# Patient Record
Sex: FEMALE | Race: WHITE | Employment: FULL TIME | URBAN - METROPOLITAN AREA
[De-identification: names, ages, dates, MRNs, and addresses within clinical notes are randomized per-mention and may not be internally consistent; named-entity substitution may affect disease eponyms.]

---

## 2018-09-04 DIAGNOSIS — G43.109 MIGRAINE WITH AURA AND WITHOUT STATUS MIGRAINOSUS, NOT INTRACTABLE: ICD-10-CM

## 2018-09-05 RX ORDER — SUMATRIPTAN 50 MG/1
50 TABLET, FILM COATED ORAL
Qty: 10 TABLET | Refills: 0 | Status: SHIPPED | OUTPATIENT
Start: 2018-09-05 | End: 2018-10-22 | Stop reason: SDUPTHER

## 2018-09-05 RX ORDER — LANCETS 30 GAUGE
EACH MISCELLANEOUS
Qty: 100 EACH | Refills: 0 | Status: SHIPPED | OUTPATIENT
Start: 2018-09-05 | End: 2018-10-22 | Stop reason: SDUPTHER

## 2018-10-22 DIAGNOSIS — G43.109 MIGRAINE WITH AURA AND WITHOUT STATUS MIGRAINOSUS, NOT INTRACTABLE: ICD-10-CM

## 2018-10-25 RX ORDER — LANCETS 30 GAUGE
EACH MISCELLANEOUS
Qty: 100 EACH | Refills: 0 | Status: SHIPPED | OUTPATIENT
Start: 2018-10-25 | End: 2018-12-07 | Stop reason: SDUPTHER

## 2018-10-25 RX ORDER — SUMATRIPTAN 50 MG/1
50 TABLET, FILM COATED ORAL
Qty: 10 TABLET | Refills: 0 | Status: SHIPPED | OUTPATIENT
Start: 2018-10-25 | End: 2019-11-25

## 2018-12-07 RX ORDER — LANCETS 30 GAUGE
EACH MISCELLANEOUS
Qty: 100 EACH | Refills: 0 | Status: SHIPPED | OUTPATIENT
Start: 2018-12-07 | End: 2019-01-16 | Stop reason: SDUPTHER

## 2018-12-22 ENCOUNTER — HOSPITAL ENCOUNTER (EMERGENCY)
Age: 33
Discharge: HOME | End: 2018-12-22
Attending: FAMILY MEDICINE
Payer: COMMERCIAL

## 2018-12-22 VITALS
TEMPERATURE: 98.5 F | RESPIRATION RATE: 16 BRPM | DIASTOLIC BLOOD PRESSURE: 84 MMHG | HEIGHT: 69 IN | BODY MASS INDEX: 20.44 KG/M2 | HEART RATE: 69 BPM | OXYGEN SATURATION: 99 % | SYSTOLIC BLOOD PRESSURE: 114 MMHG | WEIGHT: 138 LBS

## 2018-12-22 DIAGNOSIS — S09.90XA INJURY OF HEAD, INITIAL ENCOUNTER: Primary | ICD-10-CM

## 2018-12-22 PROCEDURE — 99283 EMERGENCY DEPT VISIT LOW MDM: CPT

## 2018-12-22 RX ORDER — METHYLPHENIDATE HYDROCHLORIDE 20 MG/1
20 TABLET ORAL 2 TIMES DAILY
COMMUNITY
End: 2019-11-25

## 2018-12-22 ASSESSMENT — PAIN DESCRIPTION - PAIN TYPE: TYPE: ACUTE PAIN;CHRONIC PAIN

## 2018-12-22 ASSESSMENT — PAIN DESCRIPTION - FREQUENCY: FREQUENCY: CONTINUOUS

## 2018-12-22 ASSESSMENT — PAIN SCALES - GENERAL: PAINLEVEL_OUTOF10: 3

## 2018-12-22 ASSESSMENT — PAIN DESCRIPTION - ORIENTATION: ORIENTATION: RIGHT;LEFT

## 2018-12-22 ASSESSMENT — PAIN DESCRIPTION - LOCATION: LOCATION: ABDOMEN;HEAD

## 2018-12-22 ASSESSMENT — PAIN DESCRIPTION - DESCRIPTORS: DESCRIPTORS: SHARP

## 2018-12-22 NOTE — ED PROVIDER NOTES
todays results, in addition to providing specific details for the plan of care and counseling regarding the diagnosis and prognosis. Questions are answered at this time and they are agreeable with the plan. Assessment      1. Injury of head, initial encounter      Plan   Discharge to home  Patient condition is good    New Medications     New Prescriptions    No medications on file     Electronically signed by Clau Dalton MD   DD: 12/22/18  **This report was transcribed using voice recognition software. Every effort was made to ensure accuracy; however, inadvertent computerized transcription errors may be present.   END OF ED PROVIDER NOTE          Clau Dalton MD  12/22/18 1112

## 2019-01-16 RX ORDER — LANCETS 30 GAUGE
EACH MISCELLANEOUS
Qty: 100 EACH | Refills: 0 | Status: SHIPPED
Start: 2019-01-16 | End: 2020-07-11

## 2019-04-27 ENCOUNTER — HOSPITAL ENCOUNTER (OUTPATIENT)
Age: 34
Discharge: HOME | End: 2019-04-29
Payer: COMMERCIAL

## 2019-04-27 ENCOUNTER — HOSPITAL ENCOUNTER (OUTPATIENT)
Dept: CT IMAGING | Age: 34
Discharge: HOME | End: 2019-04-29
Payer: COMMERCIAL

## 2019-04-27 DIAGNOSIS — R10.12 ABDOMINAL PAIN, LEFT UPPER QUADRANT: ICD-10-CM

## 2019-04-27 PROCEDURE — 74177 CT ABD & PELVIS W/CONTRAST: CPT

## 2019-04-27 PROCEDURE — 2580000003 HC RX 258: Performed by: INTERNAL MEDICINE

## 2019-04-27 PROCEDURE — 6360000004 HC RX CONTRAST MEDICATION: Performed by: RADIOLOGY

## 2019-04-27 RX ORDER — SODIUM CHLORIDE 0.9 % (FLUSH) 0.9 %
10 SYRINGE (ML) INJECTION PRN
Status: DISCONTINUED | OUTPATIENT
Start: 2019-04-27 | End: 2019-04-30 | Stop reason: HOSPADM

## 2019-04-27 RX ADMIN — Medication 10 ML: at 11:37

## 2019-04-27 RX ADMIN — IOPAMIDOL 100 ML: 755 INJECTION, SOLUTION INTRAVENOUS at 11:37

## 2019-11-25 ENCOUNTER — HOSPITAL ENCOUNTER (EMERGENCY)
Age: 34
Discharge: HOME | End: 2019-11-25
Attending: EMERGENCY MEDICINE
Payer: COMMERCIAL

## 2019-11-25 VITALS
WEIGHT: 135 LBS | DIASTOLIC BLOOD PRESSURE: 98 MMHG | HEART RATE: 90 BPM | RESPIRATION RATE: 16 BRPM | TEMPERATURE: 97.9 F | HEIGHT: 69 IN | BODY MASS INDEX: 19.99 KG/M2 | SYSTOLIC BLOOD PRESSURE: 142 MMHG | OXYGEN SATURATION: 99 %

## 2019-11-25 PROCEDURE — 99282 EMERGENCY DEPT VISIT SF MDM: CPT

## 2019-11-25 ASSESSMENT — PAIN DESCRIPTION - PAIN TYPE: TYPE: ACUTE PAIN

## 2019-11-25 ASSESSMENT — PAIN DESCRIPTION - LOCATION: LOCATION: HEAD

## 2019-11-25 ASSESSMENT — PAIN DESCRIPTION - DESCRIPTORS: DESCRIPTORS: DISCOMFORT;THROBBING

## 2019-11-25 ASSESSMENT — PAIN SCALES - GENERAL: PAINLEVEL_OUTOF10: 10

## 2020-07-10 ENCOUNTER — APPOINTMENT (OUTPATIENT)
Dept: CT IMAGING | Age: 35
DRG: 919 | End: 2020-07-10
Payer: COMMERCIAL

## 2020-07-10 ENCOUNTER — APPOINTMENT (OUTPATIENT)
Dept: GENERAL RADIOLOGY | Age: 35
DRG: 919 | End: 2020-07-10
Payer: COMMERCIAL

## 2020-07-10 ENCOUNTER — HOSPITAL ENCOUNTER (INPATIENT)
Age: 35
LOS: 1 days | Discharge: HOME | DRG: 919 | End: 2020-07-12
Attending: STUDENT IN AN ORGANIZED HEALTH CARE EDUCATION/TRAINING PROGRAM | Admitting: INTERNAL MEDICINE
Payer: COMMERCIAL

## 2020-07-10 LAB
ALBUMIN SERPL-MCNC: 5.2 G/DL (ref 3.5–5.2)
ALP BLD-CCNC: 86 U/L (ref 35–104)
ALT SERPL-CCNC: 15 U/L (ref 0–32)
ANION GAP SERPL CALCULATED.3IONS-SCNC: 28 MMOL/L (ref 7–16)
AST SERPL-CCNC: 17 U/L (ref 0–31)
BETA-HYDROXYBUTYRATE: >4.5 MMOL/L (ref 0.02–0.27)
BILIRUB SERPL-MCNC: 1.9 MG/DL (ref 0–1.2)
BILIRUBIN URINE: NEGATIVE
BLOOD, URINE: NEGATIVE
BUN BLDV-MCNC: 22 MG/DL (ref 6–20)
CALCIUM SERPL-MCNC: 10 MG/DL (ref 8.6–10.2)
CHLORIDE BLD-SCNC: 86 MMOL/L (ref 98–107)
CHP ED QC CHECK: YES
CLARITY: CLEAR
CO2: 17 MMOL/L (ref 22–29)
COLOR: YELLOW
CREAT SERPL-MCNC: 0.9 MG/DL (ref 0.5–1)
GFR AFRICAN AMERICAN: >60
GFR NON-AFRICAN AMERICAN: >60 ML/MIN/1.73
GLUCOSE BLD-MCNC: 475 MG/DL
GLUCOSE BLD-MCNC: 476 MG/DL (ref 74–99)
GLUCOSE URINE: 500 MG/DL
KETONES, URINE: >=80 MG/DL
LACTIC ACID, SEPSIS: 3.8 MMOL/L (ref 0.5–1.9)
LEUKOCYTE ESTERASE, URINE: NEGATIVE
LIPASE: 13 U/L (ref 13–60)
METER GLUCOSE: 413 MG/DL (ref 74–99)
METER GLUCOSE: 475 MG/DL (ref 74–99)
NITRITE, URINE: NEGATIVE
PH UA: 5.5 (ref 5–9)
PH VENOUS: 7.24 (ref 7.35–7.45)
POTASSIUM SERPL-SCNC: 4.8 MMOL/L (ref 3.5–5)
PROTEIN UA: NEGATIVE MG/DL
SODIUM BLD-SCNC: 131 MMOL/L (ref 132–146)
SPECIFIC GRAVITY UA: 1.02 (ref 1–1.03)
TOTAL PROTEIN: 8.2 G/DL (ref 6.4–8.3)
TROPONIN: <0.01 NG/ML (ref 0–0.03)
UROBILINOGEN, URINE: 0.2 E.U./DL

## 2020-07-10 PROCEDURE — 93005 ELECTROCARDIOGRAM TRACING: CPT | Performed by: STUDENT IN AN ORGANIZED HEALTH CARE EDUCATION/TRAINING PROGRAM

## 2020-07-10 PROCEDURE — 74177 CT ABD & PELVIS W/CONTRAST: CPT

## 2020-07-10 PROCEDURE — 83690 ASSAY OF LIPASE: CPT

## 2020-07-10 PROCEDURE — 84484 ASSAY OF TROPONIN QUANT: CPT

## 2020-07-10 PROCEDURE — 80053 COMPREHEN METABOLIC PANEL: CPT

## 2020-07-10 PROCEDURE — 82800 BLOOD PH: CPT

## 2020-07-10 PROCEDURE — 82010 KETONE BODYS QUAN: CPT

## 2020-07-10 PROCEDURE — 81003 URINALYSIS AUTO W/O SCOPE: CPT

## 2020-07-10 PROCEDURE — 82962 GLUCOSE BLOOD TEST: CPT

## 2020-07-10 PROCEDURE — 99285 EMERGENCY DEPT VISIT HI MDM: CPT

## 2020-07-10 PROCEDURE — 71045 X-RAY EXAM CHEST 1 VIEW: CPT

## 2020-07-10 PROCEDURE — 85025 COMPLETE CBC W/AUTO DIFF WBC: CPT

## 2020-07-10 PROCEDURE — 83605 ASSAY OF LACTIC ACID: CPT

## 2020-07-10 RX ORDER — METOCLOPRAMIDE HYDROCHLORIDE 5 MG/ML
10 INJECTION INTRAMUSCULAR; INTRAVENOUS ONCE
Status: DISCONTINUED | OUTPATIENT
Start: 2020-07-10 | End: 2020-07-12 | Stop reason: HOSPADM

## 2020-07-10 RX ORDER — 0.9 % SODIUM CHLORIDE 0.9 %
2000 INTRAVENOUS SOLUTION INTRAVENOUS ONCE
Status: DISCONTINUED | OUTPATIENT
Start: 2020-07-10 | End: 2020-07-12 | Stop reason: HOSPADM

## 2020-07-10 ASSESSMENT — PAIN SCALES - GENERAL: PAINLEVEL_OUTOF10: 6

## 2020-07-10 ASSESSMENT — PAIN DESCRIPTION - PAIN TYPE: TYPE: ACUTE PAIN

## 2020-07-11 PROBLEM — E10.10 DKA, TYPE 1, NOT AT GOAL (HCC): Status: ACTIVE | Noted: 2020-07-11

## 2020-07-11 LAB
ANION GAP SERPL CALCULATED.3IONS-SCNC: 12 MMOL/L (ref 7–16)
ANION GAP SERPL CALCULATED.3IONS-SCNC: 13 MMOL/L (ref 7–16)
ANION GAP SERPL CALCULATED.3IONS-SCNC: 15 MMOL/L (ref 7–16)
BASOPHILS ABSOLUTE: 0.11 E9/L (ref 0–0.2)
BASOPHILS RELATIVE PERCENT: 0.5 % (ref 0–2)
BUN BLDV-MCNC: 13 MG/DL (ref 6–20)
BUN BLDV-MCNC: 13 MG/DL (ref 6–20)
BUN BLDV-MCNC: 14 MG/DL (ref 6–20)
CALCIUM SERPL-MCNC: 8 MG/DL (ref 8.6–10.2)
CALCIUM SERPL-MCNC: 8 MG/DL (ref 8.6–10.2)
CALCIUM SERPL-MCNC: 8.1 MG/DL (ref 8.6–10.2)
CHLORIDE BLD-SCNC: 105 MMOL/L (ref 98–107)
CHLORIDE BLD-SCNC: 106 MMOL/L (ref 98–107)
CHLORIDE BLD-SCNC: 107 MMOL/L (ref 98–107)
CHP ED QC CHECK: YES
CHP ED QC CHECK: YES
CO2: 16 MMOL/L (ref 22–29)
CO2: 17 MMOL/L (ref 22–29)
CO2: 18 MMOL/L (ref 22–29)
CREAT SERPL-MCNC: 0.7 MG/DL (ref 0.5–1)
CREAT SERPL-MCNC: 0.8 MG/DL (ref 0.5–1)
CREAT SERPL-MCNC: 0.8 MG/DL (ref 0.5–1)
EKG ATRIAL RATE: 119 BPM
EKG P AXIS: 77 DEGREES
EKG P-R INTERVAL: 124 MS
EKG Q-T INTERVAL: 330 MS
EKG QRS DURATION: 78 MS
EKG QTC CALCULATION (BAZETT): 464 MS
EKG R AXIS: 80 DEGREES
EKG T AXIS: 57 DEGREES
EKG VENTRICULAR RATE: 119 BPM
EOSINOPHILS ABSOLUTE: 0.01 E9/L (ref 0.05–0.5)
EOSINOPHILS RELATIVE PERCENT: 0 % (ref 0–6)
GFR AFRICAN AMERICAN: >60
GFR NON-AFRICAN AMERICAN: >60 ML/MIN/1.73
GLUCOSE BLD-MCNC: 180 MG/DL (ref 74–99)
GLUCOSE BLD-MCNC: 227 MG/DL (ref 74–99)
GLUCOSE BLD-MCNC: 236 MG/DL (ref 74–99)
GLUCOSE BLD-MCNC: 296 MG/DL
GLUCOSE BLD-MCNC: 330 MG/DL
HBA1C MFR BLD: 9 % (ref 4–5.6)
HCT VFR BLD CALC: 44.7 % (ref 34–48)
HEMOGLOBIN: 15 G/DL (ref 11.5–15.5)
IMMATURE GRANULOCYTES #: 0.17 E9/L
IMMATURE GRANULOCYTES %: 0.8 % (ref 0–5)
LACTIC ACID, SEPSIS: 2.3 MMOL/L (ref 0.5–1.9)
LACTIC ACID: 1.3 MMOL/L (ref 0.5–2.2)
LYMPHOCYTES ABSOLUTE: 1.04 E9/L (ref 1.5–4)
LYMPHOCYTES RELATIVE PERCENT: 4.7 % (ref 20–42)
MAGNESIUM: 2 MG/DL (ref 1.6–2.6)
MAGNESIUM: 2 MG/DL (ref 1.6–2.6)
MAGNESIUM: 2.2 MG/DL (ref 1.6–2.6)
MCH RBC QN AUTO: 29 PG (ref 26–35)
MCHC RBC AUTO-ENTMCNC: 33.6 % (ref 32–34.5)
MCV RBC AUTO: 86.3 FL (ref 80–99.9)
METER GLUCOSE: 148 MG/DL (ref 74–99)
METER GLUCOSE: 161 MG/DL (ref 74–99)
METER GLUCOSE: 165 MG/DL (ref 74–99)
METER GLUCOSE: 165 MG/DL (ref 74–99)
METER GLUCOSE: 168 MG/DL (ref 74–99)
METER GLUCOSE: 171 MG/DL (ref 74–99)
METER GLUCOSE: 200 MG/DL (ref 74–99)
METER GLUCOSE: 206 MG/DL (ref 74–99)
METER GLUCOSE: 213 MG/DL (ref 74–99)
METER GLUCOSE: 231 MG/DL (ref 74–99)
METER GLUCOSE: 296 MG/DL (ref 74–99)
METER GLUCOSE: 330 MG/DL (ref 74–99)
MONOCYTES ABSOLUTE: 0.84 E9/L (ref 0.1–0.95)
MONOCYTES RELATIVE PERCENT: 3.8 % (ref 2–12)
NEUTROPHILS ABSOLUTE: 20.02 E9/L (ref 1.8–7.3)
NEUTROPHILS RELATIVE PERCENT: 90.2 % (ref 43–80)
PDW BLD-RTO: 12.5 FL (ref 11.5–15)
PHOSPHORUS: 1.8 MG/DL (ref 2.5–4.5)
PHOSPHORUS: 2.7 MG/DL (ref 2.5–4.5)
PHOSPHORUS: 3.3 MG/DL (ref 2.5–4.5)
PLATELET # BLD: 315 E9/L (ref 130–450)
PMV BLD AUTO: 9 FL (ref 7–12)
POTASSIUM SERPL-SCNC: 4.2 MMOL/L (ref 3.5–5)
RBC # BLD: 5.18 E12/L (ref 3.5–5.5)
RBC # BLD: NORMAL 10*6/UL
SODIUM BLD-SCNC: 136 MMOL/L (ref 132–146)
SODIUM BLD-SCNC: 136 MMOL/L (ref 132–146)
SODIUM BLD-SCNC: 137 MMOL/L (ref 132–146)
WBC # BLD: 22.2 E9/L (ref 4.5–11.5)

## 2020-07-11 PROCEDURE — 96375 TX/PRO/DX INJ NEW DRUG ADDON: CPT

## 2020-07-11 PROCEDURE — 1200000000 HC SEMI PRIVATE

## 2020-07-11 PROCEDURE — 6360000004 HC RX CONTRAST MEDICATION: Performed by: RADIOLOGY

## 2020-07-11 PROCEDURE — 93010 ELECTROCARDIOGRAM REPORT: CPT | Performed by: INTERNAL MEDICINE

## 2020-07-11 PROCEDURE — 6360000002 HC RX W HCPCS: Performed by: INTERNAL MEDICINE

## 2020-07-11 PROCEDURE — 36415 COLL VENOUS BLD VENIPUNCTURE: CPT

## 2020-07-11 PROCEDURE — 96374 THER/PROPH/DIAG INJ IV PUSH: CPT

## 2020-07-11 PROCEDURE — 83036 HEMOGLOBIN GLYCOSYLATED A1C: CPT

## 2020-07-11 PROCEDURE — 2500000003 HC RX 250 WO HCPCS: Performed by: INTERNAL MEDICINE

## 2020-07-11 PROCEDURE — 87081 CULTURE SCREEN ONLY: CPT

## 2020-07-11 PROCEDURE — 6370000000 HC RX 637 (ALT 250 FOR IP): Performed by: EMERGENCY MEDICINE

## 2020-07-11 PROCEDURE — 2580000003 HC RX 258: Performed by: EMERGENCY MEDICINE

## 2020-07-11 PROCEDURE — 6370000000 HC RX 637 (ALT 250 FOR IP): Performed by: INTERNAL MEDICINE

## 2020-07-11 PROCEDURE — 80048 BASIC METABOLIC PNL TOTAL CA: CPT

## 2020-07-11 PROCEDURE — 82962 GLUCOSE BLOOD TEST: CPT

## 2020-07-11 PROCEDURE — 84100 ASSAY OF PHOSPHORUS: CPT

## 2020-07-11 PROCEDURE — 83605 ASSAY OF LACTIC ACID: CPT

## 2020-07-11 PROCEDURE — 2580000003 HC RX 258: Performed by: RADIOLOGY

## 2020-07-11 PROCEDURE — 6360000002 HC RX W HCPCS

## 2020-07-11 PROCEDURE — 2580000003 HC RX 258: Performed by: INTERNAL MEDICINE

## 2020-07-11 PROCEDURE — 99223 1ST HOSP IP/OBS HIGH 75: CPT | Performed by: INTERNAL MEDICINE

## 2020-07-11 PROCEDURE — 6360000002 HC RX W HCPCS: Performed by: EMERGENCY MEDICINE

## 2020-07-11 PROCEDURE — 83735 ASSAY OF MAGNESIUM: CPT

## 2020-07-11 RX ORDER — ONDANSETRON 2 MG/ML
4 INJECTION INTRAMUSCULAR; INTRAVENOUS ONCE
Status: COMPLETED | OUTPATIENT
Start: 2020-07-11 | End: 2020-07-11

## 2020-07-11 RX ORDER — ONDANSETRON 2 MG/ML
INJECTION INTRAMUSCULAR; INTRAVENOUS
Status: COMPLETED
Start: 2020-07-11 | End: 2020-07-11

## 2020-07-11 RX ORDER — SODIUM CHLORIDE 0.9 % (FLUSH) 0.9 %
10 SYRINGE (ML) INJECTION 2 TIMES DAILY
Status: DISCONTINUED | OUTPATIENT
Start: 2020-07-11 | End: 2020-07-12 | Stop reason: HOSPADM

## 2020-07-11 RX ORDER — PROMETHAZINE HYDROCHLORIDE 25 MG/ML
12.5 INJECTION, SOLUTION INTRAMUSCULAR; INTRAVENOUS EVERY 8 HOURS PRN
Status: DISCONTINUED | OUTPATIENT
Start: 2020-07-11 | End: 2020-07-11

## 2020-07-11 RX ORDER — INSULIN GLARGINE 100 [IU]/ML
10 INJECTION, SOLUTION SUBCUTANEOUS ONCE
Status: COMPLETED | OUTPATIENT
Start: 2020-07-11 | End: 2020-07-11

## 2020-07-11 RX ORDER — DEXTROSE AND SODIUM CHLORIDE 5; .45 G/100ML; G/100ML
INJECTION, SOLUTION INTRAVENOUS CONTINUOUS PRN
Status: DISCONTINUED | OUTPATIENT
Start: 2020-07-11 | End: 2020-07-11

## 2020-07-11 RX ORDER — INSULIN GLARGINE 100 [IU]/ML
10 INJECTION, SOLUTION SUBCUTANEOUS NIGHTLY
Status: DISCONTINUED | OUTPATIENT
Start: 2020-07-12 | End: 2020-07-12 | Stop reason: HOSPADM

## 2020-07-11 RX ORDER — POTASSIUM CHLORIDE 7.45 MG/ML
10 INJECTION INTRAVENOUS PRN
Status: DISCONTINUED | OUTPATIENT
Start: 2020-07-11 | End: 2020-07-11

## 2020-07-11 RX ORDER — PROMETHAZINE HYDROCHLORIDE 25 MG/ML
12.5 INJECTION, SOLUTION INTRAMUSCULAR; INTRAVENOUS EVERY 8 HOURS PRN
Status: DISCONTINUED | OUTPATIENT
Start: 2020-07-11 | End: 2020-07-12 | Stop reason: HOSPADM

## 2020-07-11 RX ORDER — 0.9 % SODIUM CHLORIDE 0.9 %
15 INTRAVENOUS SOLUTION INTRAVENOUS ONCE
Status: DISCONTINUED | OUTPATIENT
Start: 2020-07-11 | End: 2020-07-12 | Stop reason: HOSPADM

## 2020-07-11 RX ORDER — MAGNESIUM SULFATE 1 G/100ML
1 INJECTION INTRAVENOUS PRN
Status: DISCONTINUED | OUTPATIENT
Start: 2020-07-11 | End: 2020-07-11

## 2020-07-11 RX ORDER — SODIUM CHLORIDE 9 MG/ML
INJECTION, SOLUTION INTRAVENOUS CONTINUOUS
Status: DISCONTINUED | OUTPATIENT
Start: 2020-07-11 | End: 2020-07-11

## 2020-07-11 RX ORDER — PAROXETINE HYDROCHLORIDE 20 MG/1
20 TABLET, FILM COATED ORAL NIGHTLY
Status: DISCONTINUED | OUTPATIENT
Start: 2020-07-11 | End: 2020-07-12 | Stop reason: HOSPADM

## 2020-07-11 RX ORDER — DEXTROSE MONOHYDRATE 50 MG/ML
100 INJECTION, SOLUTION INTRAVENOUS PRN
Status: DISCONTINUED | OUTPATIENT
Start: 2020-07-11 | End: 2020-07-12 | Stop reason: HOSPADM

## 2020-07-11 RX ORDER — DEXTROSE MONOHYDRATE 25 G/50ML
12.5 INJECTION, SOLUTION INTRAVENOUS PRN
Status: DISCONTINUED | OUTPATIENT
Start: 2020-07-11 | End: 2020-07-12 | Stop reason: HOSPADM

## 2020-07-11 RX ORDER — PROMETHAZINE HYDROCHLORIDE 25 MG/ML
12.5 INJECTION, SOLUTION INTRAMUSCULAR; INTRAVENOUS ONCE
Status: COMPLETED | OUTPATIENT
Start: 2020-07-11 | End: 2020-07-11

## 2020-07-11 RX ORDER — DEXTROSE MONOHYDRATE 25 G/50ML
12.5 INJECTION, SOLUTION INTRAVENOUS PRN
Status: DISCONTINUED | OUTPATIENT
Start: 2020-07-11 | End: 2020-07-11

## 2020-07-11 RX ORDER — SODIUM CHLORIDE 9 MG/ML
INJECTION, SOLUTION INTRAVENOUS CONTINUOUS
Status: DISCONTINUED | OUTPATIENT
Start: 2020-07-11 | End: 2020-07-11 | Stop reason: SDUPTHER

## 2020-07-11 RX ORDER — NICOTINE POLACRILEX 4 MG
15 LOZENGE BUCCAL PRN
Status: DISCONTINUED | OUTPATIENT
Start: 2020-07-11 | End: 2020-07-12 | Stop reason: HOSPADM

## 2020-07-11 RX ADMIN — INSULIN LISPRO 4 UNITS: 100 INJECTION, SOLUTION INTRAVENOUS; SUBCUTANEOUS at 17:19

## 2020-07-11 RX ADMIN — POTASSIUM CHLORIDE 10 MEQ: 7.46 INJECTION, SOLUTION INTRAVENOUS at 10:34

## 2020-07-11 RX ADMIN — INSULIN LISPRO 2 UNITS: 100 INJECTION, SOLUTION INTRAVENOUS; SUBCUTANEOUS at 21:36

## 2020-07-11 RX ADMIN — INSULIN GLARGINE 10 UNITS: 100 INJECTION, SOLUTION SUBCUTANEOUS at 13:26

## 2020-07-11 RX ADMIN — DEXTROSE AND SODIUM CHLORIDE: 5; 450 INJECTION, SOLUTION INTRAVENOUS at 13:05

## 2020-07-11 RX ADMIN — POTASSIUM CHLORIDE 10 MEQ: 7.46 INJECTION, SOLUTION INTRAVENOUS at 13:04

## 2020-07-11 RX ADMIN — IOPAMIDOL 100 ML: 755 INJECTION, SOLUTION INTRAVENOUS at 03:31

## 2020-07-11 RX ADMIN — Medication 10 ML: at 03:29

## 2020-07-11 RX ADMIN — ENOXAPARIN SODIUM 40 MG: 40 INJECTION SUBCUTANEOUS at 08:31

## 2020-07-11 RX ADMIN — ONDANSETRON 4 MG: 2 INJECTION INTRAMUSCULAR; INTRAVENOUS at 03:10

## 2020-07-11 RX ADMIN — PROMETHAZINE HYDROCHLORIDE 12.5 MG: 25 INJECTION INTRAMUSCULAR; INTRAVENOUS at 23:14

## 2020-07-11 RX ADMIN — PAROXETINE HYDROCHLORIDE 20 MG: 20 TABLET, FILM COATED ORAL at 21:46

## 2020-07-11 RX ADMIN — Medication 10 ML: at 22:00

## 2020-07-11 RX ADMIN — PROMETHAZINE HYDROCHLORIDE 12.5 MG: 25 INJECTION INTRAMUSCULAR; INTRAVENOUS at 05:26

## 2020-07-11 RX ADMIN — SODIUM PHOSPHATE, MONOBASIC, MONOHYDRATE 15 MMOL: 276; 142 INJECTION, SOLUTION INTRAVENOUS at 10:16

## 2020-07-11 RX ADMIN — SODIUM CHLORIDE 0.1 UNITS/KG/HR: 9 INJECTION, SOLUTION INTRAVENOUS at 04:12

## 2020-07-11 RX ADMIN — DEXTROSE AND SODIUM CHLORIDE 150 ML/HR: 5; 450 INJECTION, SOLUTION INTRAVENOUS at 06:46

## 2020-07-11 RX ADMIN — POTASSIUM CHLORIDE 10 MEQ: 7.46 INJECTION, SOLUTION INTRAVENOUS at 09:34

## 2020-07-11 RX ADMIN — PROMETHAZINE HYDROCHLORIDE 12.5 MG: 25 INJECTION INTRAMUSCULAR; INTRAVENOUS at 14:32

## 2020-07-11 ASSESSMENT — PAIN SCALES - GENERAL
PAINLEVEL_OUTOF10: 0

## 2020-07-11 NOTE — PROGRESS NOTES
I have personally seen and examined the patient  Patient was admitted for DKA    1. DKA1, started the patient on DKA protocol with IVF and insulin drip, anion gap on presentation was 28, now closed, we will repeat BMP and stop insulin drip  2. DM I, usually on insulin pump, she stopped it a couple of days ago which would have been the trigger for DKA.  We will start the patient on 10 units of lantus and sliding scale

## 2020-07-11 NOTE — CONSULTS
Critical Care Admit/Consult Note         Patient - Baron Prakash   MRN -  [id number]   [place] # - [de-identified]   - 1985      Date of Admission -  7/10/2020  8:57 PM  Date of evaluation -  2020/0213-A   Hospital Day - 0            ADMIT/CONSULT DETAILS     Reason for Admit/Consult   DKA     Consulting Service/Physician   Consulting - Sonia Tamayo MD  Primary Care Physician - MD LUZ Adam   The patient is a 28 y.o. female with significant past medical history of insulin-dependent diabetes (on insulin pump, onset after development of gestational diabetes in ) who presented to [facility] emergency department yesterday evening with nausea, vomiting, abdominal pain. The patient had been in a aquatic setting and believes that her pump was not appropriately sticking to her skin. On evaluation in the emergency department, she was found to be in DKA with an elevated anion gap as well as an elevated beta hydroxybutyrate and a blood glucose of 476. For this reason an insulin drip was initiated and the patient was admitted to the ICU for close observation and monitoring.       Past Medical History         Diagnosis Date    ADD (attention deficit disorder with hyperactivity)     Anxiety     Depression     Diabetes mellitus ([facility] [address]     Headache         Past Surgical History           Procedure Laterality Date    CHOLECYSTECTOMY      ENDOMETRIAL ABLATION      HYSTERECTOMY      TUBAL LIGATION           Current Medications   Current Medications    sodium chloride flush  10 mL Intravenous BID    enoxaparin  40 mg Subcutaneous Daily    sodium chloride  2,000 mL Intravenous Once    metoclopramide  10 mg Intravenous Once     glucose, dextrose, glucagon (rDNA), dextrose, dextrose, potassium chloride, magnesium sulfate, sodium phosphate IVPB **OR** sodium phosphate IVPB **OR** sodium phosphate IVPB, dextrose 5 % and 0.45 % NaCl  IV Drips/Infusions   dextrose      insulin 4.38 Units/hr (07/11/20 0815)    sodium chloride      dextrose 5 % and 0.45 % NaCl 150 mL/hr (07/11/20 0646)     Home Medications  Medications Prior to Admission: PARoxetine (PAXIL) 30 MG tablet, Take 20 mg by mouth nightly     Diet/Nutrition   Diet NPO Effective Now    Allergies   Prozac [fluoxetine hcl] and Zoloft [sertraline hcl]    Social History   Tobacco   reports that she has never smoked. She has never used smokeless tobacco.    Alcohol     reports no history of alcohol use.     Occupational history :    Family History         Problem Relation Age of Onset    Diabetes Mother     Elevated Lipids Mother     Cancer Father         testicular cancer possibly    Seizures Sister          ROS     REVIEW OF SYSTEMS:  CONSTITUTIONAL:  negative for  fevers, chills and sweats, positive for fatigue and malaise  EYES:  negative for  blurred vision and visual disturbance  HEENT:  negative for  sore throat and hoarseness  RESPIRATORY:  negative for  cough with sputum, dyspnea and wheezing  CARDIOVASCULAR:  negative for  chest pain, dyspnea, palpitations  GASTROINTESTINAL:  positive for nausea, vomiting and abdominal pain  GENITOURINARY:  negative for dysuria and urinary incontinence  ENDOCRINE:  negative except for diabetic symptoms including neither nausea nor vomiting  MUSCULOSKELETAL:  negative for  myalgias and arthralgias  NEUROLOGICAL:  negative for headaches, dizziness, seizures, weakness, numbness and syncope    Lines and Devices    PIV x 2    Mechanical Ventilation Data   VENT SETTINGS (Comprehensive)  Vent Information  SpO2: 100 %  Additional Respiratory  Assessments  Pulse: 105  Resp: 22  SpO2: 100 %    ABG  Lab Results   Component Value Date    PH 7.256 06/15/2015    PH 7.36 12/04/2011    PCO2 23.2 06/15/2015    PO2 103.5 06/15/2015    HCO3 10.1 06/15/2015    O2SAT 98.0 06/15/2015     Lab Results   Component Value Date    MODE RA 06/15/2015           Vitals    height is 5' 9\" (1.753 m) and weight is 131 lb 9.8 oz (59.7 kg). Her oral temperature is 99.2 °F (37.3 °C). Her blood pressure is 108/70 and her pulse is 105. Her respiration is 22 and oxygen saturation is 100%. Temperature Range: Temp: 99.2 °F (37.3 °C) Temp  Av.4 °F (36.9 °C)  Min: 97.5 °F (36.4 °C)  Max: 99.2 °F (37.3 °C)  BP Range:  Systolic (10LWY), XWO:306 , Min:108 , EUF:281     Diastolic (40SCV), DPE:77, Min:65, Max:81    Pulse Range: Pulse  Av.2  Min: 104  Max: 132  Respiration Range: Resp  Av.5  Min: 16  Max: 22  Current Pulse Ox[de-identified]  SpO2: 100 %  24HR Pulse Ox Range:  SpO2  Av.2 %  Min: 98 %  Max: 100 %  Oxygen Amount and Delivery:        I/O (24 Hours)    Patient Vitals for the past 8 hrs:   BP Temp Temp src Pulse Resp SpO2 Weight   20 0634 108/70 99.2 °F (37.3 °C) Oral 105 22 100 % 131 lb 9.8 oz (59.7 kg)   20 0554 110/76 -- -- 113 -- 100 % --   20 0549 110/76 -- -- 117 16 98 % --   20 0509 110/65 98.6 °F (37 °C) Oral 104 18 98 % --     No intake or output data in the 24 hours ending 20 0836  No intake/output data recorded. Patient Vitals for the past 96 hrs (Last 3 readings):   Weight   20 0634 131 lb 9.8 oz (59.7 kg)   07/10/20 2054 135 lb (61.2 kg)         Drains/Tubes Outputs  None  Exam       PHYSICAL EXAM:  CONSTITUTIONAL:  awake, alert, cooperative, no apparent distress, and appears stated age  EYES:  Lids and lashes normal, pupils equal, round and reactive to light, extra ocular muscles intact, sclera clear, conjunctiva normal  ENT:  Normocephalic, without obvious abnormality, atraumatic, sinuses nontender on palpation, external ears without lesions, oral pharynx with moist mucus membranes, tonsils without erythema or exudates, gums normal and good dentition.   NECK:  Supple, symmetrical, trachea midline, no adenopathy, thyroid symmetric, not enlarged and no tenderness, skin normal  HEMATOLOGIC/LYMPHATICS:  no cervical lymphadenopathy  LUNGS:  No increased work of last 72 hours. Lactic Acid  Lab Results   Component Value Date    LACTA 1.2 01/16/2017    LACTA 1.4 06/15/2015    LACTA 3.3 06/15/2015        BNP   No results for input(s): BNP in the last 72 hours. Cultures     No results for input(s): BC in the last 72 hours. No results for input(s): Darlis Jackman in the last 72 hours. No results for input(s): LABURIN in the last 72 hours. Radiology   Ct Abdomen Pelvis W Iv Contrast Additional Contrast? None    Result Date: 7/11/2020  PROCEDURE INFORMATION: Exam: CT Abdomen And Pelvis With Contrast Exam date and time: 7/10/2020 10:45 PM Age: 28years old Clinical indication: Abdominal pain; Prior surgery; Surgery type: [place], partial hyster; Additional info: Periumbilical pain TECHNIQUE: Imaging protocol: Computed tomography of the abdomen and pelvis with intravenous contrast. Radiation optimization: All CT scans at this facility use at least one of these dose optimization techniques: automated exposure control; mA and/or kV adjustment per patient size (includes targeted exams where dose is matched to clinical indication); or iterative reconstruction. Contrast material: [id number]; Contrast volume: 100 ml; Contrast route: INTRAVENOUS (IV);  COMPARISON: CT ABDOMEN PELVIS W CONTRAST 4/27/2019 11:27 AM FINDINGS: Liver: Hypoattenuation seen adjacent to the falciform fissure compatible with some focal fatty infiltration. Gallbladder and bile ducts: Status post cholecystectomy. Pancreas: Normal. No ductal dilation. Spleen: Normal. No splenomegaly. Adrenals: Normal. No mass. Kidneys and ureters: Normal. No hydronephrosis. Stomach and bowel: Unremarkable. No obstruction. No mucosal thickening. Appendix: The appendix is visualized and is normal in configuration. Intraperitoneal space: Unremarkable. No free air. No significant fluid collection. Vasculature: Unremarkable. No abdominal aortic aneurysm. Lymph nodes: Unremarkable. No enlarged lymph nodes.  Bladder: Unremarkable as visualized. Reproductive: There are 2 contiguous cystic masses seen associated with the right ovary. A 1.3 x 1.6 x 1.5 cm simple appearing cyst is seen. And adjacent 1.5 x 2.0 x 1.8 cm cyst is seen exhibiting some peripheral enhancement likely representing a small hemorrhagic cyst. The uterus is retroflexed. Bones/joints: Unremarkable. No acute fracture. Soft tissues: Unremarkable. 1. There are 2 benign-appearing cysts within the right ovary. The largest measures 2 cm in its greatest diameter and exhibits peripheral enhancement compatible with a hemorrhagic cyst. No further workup is needed. 2. Normal appendix 3. Focal fatty infiltration adjacent to the falciform fissure of the liver This report has been electronically signed by Raheel Tom MD.    Xr Chest Portable    Result Date: 7/10/2020  Single frontal projection (one view) of the chest. COMPARISON: May 30, 2017. FINDINGS: The heart, lungs, mediastinum and regional skeleton are unremarkable. The costophrenic angles are sharp and clear. There is no evidence of mediastinal shift. The heart is not enlarged. There is no evidence of hilar adenopathy. Lungs are negative for evidence of acute airspace consolidation, effusion, atelectasis, pneumothorax, pathologic nodularity or interstitial thickening. Regional bone density and trabecular pattern are normal.     Negative one view chest.    SYSTEMS ASSESSMENT    Neuro   A&Ox4, no acute pathology  Avoid Sedating Agents    Respiratory   wnl   Cxr reviewed normal   Wean oxygen as tolerated.  Keep O2 sat 90-92%    Cardiovascular   icu telemetry monitoring   No cp   ekg normal on admission     Gastrointestinal   GI symptoms 2/2 DKA, now resolved  NPO until DKA resolved    Renal   Monitor urine output  Creatinine wnl   Avoid nephrotoxic agents  Monitor potassium shift as BG is corrected  Replete electrolytes as needed     Infectious Disease   Initial leukocytosis of 22, likely reactive and worsened with volume contraction  Anticipate improvement with DKA treatment  Afebrile, no overt infectious source  AM CBC  Continue to monitor    Hematology/Oncology     H/h increased likely hemoconcentration. Monitor trend   Platelets wnl   dvt prophylaxis     Endocrine   DKA per protocol inciting factor pump malfunction  Insulin Drip 0.1U/Kg/Hr  /hr  Switch to D5 1/2NS once BG below 250  POC glucose QH  BMP Q4H  Transition to SQ insulin once AG closed x2          Social/Spiritual/DNR/Other   Full code  Lovenox/SCD's for DVT prophylaxis    Mingo Lloyd D. VIVIAN  PGY-2  8:54 AM EDT      I personally saw, examined and provided care for the patient. Radiographs, labs and medication list were reviewed by me independently. I spoke with bedside nursing, therapists and consultants. Critical care services and times documented are independent of procedures and multidisciplinary rounds with Residents. Additionally comprehensive, multidisciplinary rounds were conducted with the MICU team. The case was discussed in detail and plans for care were established. Review of Residents documentation was conducted and revisions were made as appropriate. I agree with the above documented exam, problem list and plan of care. dka per protocol   Trend labs     Donavan Fernandes M.D.    Pulmonary/Critical Care Medicine   35 min cct excluding procedures

## 2020-07-11 NOTE — ED PROVIDER NOTES
HPI:  7/10/20, Time: 10:57 PM EDT         Yaa Leigh is a 28 y.o. female presenting to the ED for nausea/vomiting, palpitations, and upper abdominal pain, beginning today. The complaint has been persistent, severe in severity, and worsened by nothing. Patient has histories of diabetes. States that she feels like she is in DKA. She had sudden onset of nausea/vomiting 2 hours prior to coming emergency department. States that she is having a headache now as well. Denies any fevers or chest pain, shortness of breath. Patient has history of hysterectomy and states there is no chance of pregnancy. Blood sugars greater than 400 on presentation emergency department. States that she does not know why she would be in DKA. States that she is very thirsty. No other complaints at this time. Patient denies fever/chills, cough, congestion, chest pain, shortness of breath, edema, headache, visual disturbances, focal paresthesias, focal weakness, constipation, dysuria, hematuria, trauma, neck or back pain or other complaints. ROS:   Pertinent positives and negatives are stated within HPI, all other systems reviewed and are negative.      --------------------------------------------- PAST HISTORY ---------------------------------------------  Past Medical History:  has a past medical history of ADD (attention deficit disorder with hyperactivity), Anxiety, Depression, Diabetes mellitus ([facility] [address] and Headache. Past Surgical History:  has a past surgical history that includes Hysterectomy; Tubal ligation; Endometrial ablation; and Cholecystectomy. Social History:  reports that she has never smoked. She has never used smokeless tobacco. She reports that she does not drink alcohol or use drugs. Family History: family history includes Cancer in her father; Diabetes in her mother; Elevated Lipids in her mother; Seizures in her sister. The patients home medications have been reviewed.     Allergies: Prozac [fluoxetine hcl] and Zoloft [sertraline hcl]        ---------------------------------------------------PHYSICAL EXAM--------------------------------------    Constitutional:  Well developed, well nourished, no acute distress, non-toxic appearance   Eyes:  PERRL, conjunctiva normal, EOMI  HENT:  Atraumatic, external ears normal, nose normal, oropharynx moist. Neck- normal range of motion, no tenderness, supple   Respiratory:  No respiratory distress, normal breath sounds, no rales, no wheezing   Cardiovascular:  Normal rate, normal rhythm, no murmurs, no gallops, no rubs. Radial and DP pulses 2+ bilaterally. GI: Umbilical tenderness on palpation. :  No costovertebral angle tenderness   Musculoskeletal:  No edema, no tenderness, no deformities. Back- no tenderness  Integument:  Well hydrated, no rash. Adequate perfusion. Lymphatic:  No lymphadenopathy noted   Neurologic:  Alert & oriented x 3, CN 2-12 normal, normal motor function, normal sensory function, no focal deficits noted. Psychiatric:  Speech and behavior appropriate       -------------------------------------------------- RESULTS -------------------------------------------------  I have personally reviewed all laboratory and imaging results for this patient. Results are listed below.      LABS:  Results for orders placed or performed during the hospital encounter of 07/10/20   Beta-Hydroxybutyrate   Result Value Ref Range    Beta-Hydroxybutyrate >4.50 (H) 0.02 - 0.27 mmol/L   Comprehensive metabolic panel   Result Value Ref Range    Sodium 131 (L) 132 - 146 mmol/L    Potassium 4.8 3.5 - 5.0 mmol/L    Chloride 86 (L) 98 - 107 mmol/L    CO2 17 (L) 22 - 29 mmol/L    Anion Gap 28 (H) 7 - 16 mmol/L    Glucose 476 (H) 74 - 99 mg/dL    BUN 22 (H) 6 - 20 mg/dL    CREATININE 0.9 0.5 - 1.0 mg/dL    GFR Non-African American >60 >=60 mL/min/1.73    GFR African American >60     Calcium 10.0 8.6 - 10.2 mg/dL    Total Protein 8.2 6.4 - 8.3 g/dL    Alb 5.2 3.5 - 5.2 g/dL    Total Bilirubin 1.9 (H) 0.0 - 1.2 mg/dL    Alkaline Phosphatase 86 35 - 104 U/L    ALT 15 0 - 32 U/L    AST 17 0 - 31 U/L   CBC auto differential   Result Value Ref Range    WBC 22.2 (H) 4.5 - 11.5 E9/L    RBC 5.18 3.50 - 5.50 E12/L    Hemoglobin 15.0 11.5 - 15.5 g/dL    Hematocrit 44.7 34.0 - 48.0 %    MCV 86.3 80.0 - 99.9 fL    MCH 29.0 26.0 - 35.0 pg    MCHC 33.6 32.0 - 34.5 %    RDW 12.5 11.5 - 15.0 fL    Platelets 404 143 - 402 E9/L    MPV 9.0 7.0 - 12.0 fL    Neutrophils % 90.2 (H) 43.0 - 80.0 %    Immature Granulocytes % 0.8 0.0 - 5.0 %    Lymphocytes % 4.7 (L) 20.0 - 42.0 %    Monocytes % 3.8 2.0 - 12.0 %    Eosinophils % 0.0 0.0 - 6.0 %    Basophils % 0.5 0.0 - 2.0 %    Neutrophils Absolute 20.02 (H) 1.80 - 7.30 E9/L    Immature Granulocytes # 0.17 E9/L    Lymphocytes Absolute 1.04 (L) 1.50 - 4.00 E9/L    Monocytes Absolute 0.84 0.10 - 0.95 E9/L    Eosinophils Absolute 0.01 (L) 0.05 - 0.50 E9/L    Basophils Absolute 0.11 0.00 - 0.20 E9/L    RBC Morphology Normal    Troponin   Result Value Ref Range    Troponin <0.01 0.00 - 0.03 ng/mL   Lactate, Sepsis   Result Value Ref Range    Lactic Acid, Sepsis 3.8 (H) 0.5 - 1.9 mmol/L   Lactate, Sepsis   Result Value Ref Range    Lactic Acid, Sepsis 2.3 (H) 0.5 - 1.9 mmol/L   PH, VENOUS   Result Value Ref Range    pH, Ivan 7.24 (L) 7.35 - 7.45   URINALYSIS   Result Value Ref Range    Color, UA Yellow Straw/Yellow    Clarity, UA Clear Clear    Glucose, Ur 500 (A) Negative mg/dL    Bilirubin Urine Negative Negative    Ketones, Urine >=80 (A) Negative mg/dL    Specific Gravity, UA 1.025 1.005 - 1.030    Blood, Urine Negative Negative    pH, UA 5.5 5.0 - 9.0    Protein, UA Negative Negative mg/dL    Urobilinogen, Urine 0.2 <2.0 E.U./dL    Nitrite, Urine Negative Negative    Leukocyte Esterase, Urine Negative Negative   Lipase   Result Value Ref Range    Lipase 13 13 - 60 U/L   POCT glucose   Result Value Ref Range    Glucose 475 mg/dL    QC OK?  yes    POCT Glucose   Result Value Ref Range    Meter Glucose 475 (H) 74 - 99 mg/dL   POCT Glucose   Result Value Ref Range    Meter Glucose 413 (H) 74 - 99 mg/dL   POCT Glucose   Result Value Ref Range    Glucose 330 mg/dL    QC OK? yes    POCT Glucose   Result Value Ref Range    Meter Glucose 330 (H) 74 - 99 mg/dL   EKG 12 Lead   Result Value Ref Range    Ventricular Rate 119 BPM    Atrial Rate 119 BPM    P-R Interval 124 ms    QRS Duration 78 ms    Q-T Interval 330 ms    QTc Calculation (Bazett) 464 ms    P Axis 77 degrees    R Axis 80 degrees    T Axis 57 degrees       RADIOLOGY:  Interpreted by Radiologist.  CT ABDOMEN PELVIS W IV CONTRAST Additional Contrast? None   Final Result   1. There are 2 benign-appearing cysts within the right ovary. The largest    measures 2 cm in its greatest diameter and exhibits peripheral enhancement    compatible with a hemorrhagic cyst. No further workup is needed. 2. Normal appendix    3. Focal fatty infiltration adjacent to the falciform fissure of the liver       This report has been electronically signed by Paulette Torres MD.      XR CHEST PORTABLE   Final Result      Negative one view chest.            EKG Interpretation  Interpreted by emergency department physician, Dr. Joseline Ayala: normal  Rhythm: normal sinus   Clinical Impression: non-specific EKG  Comparison to prior EKG: None      ------------------------- NURSING NOTES AND VITALS REVIEWED ---------------------------   The nursing notes within the ED encounter and vital signs as below have been reviewed by myself. /81   Pulse 132   Temp 97.5 °F (36.4 °C) (Infrared)   Resp 18   Ht 5' 9\" (1.753 m)   Wt 135 lb (61.2 kg)   SpO2 100%   BMI 19.94 kg/m²   Oxygen Saturation Interpretation: Normal    The patients available past medical records and past encounters were reviewed.         ------------------------------ ED COURSE/MEDICAL DECISION MAKING----------------------  Medications   0.9 % sodium chloride bolus (has no administration in time range)   metoclopramide (REGLAN) injection 10 mg (has no administration in time range)   ondansetron (ZOFRAN) 4 MG/2ML injection (has no administration in time range)   glucose (GLUTOSE) 40 % oral gel 15 g (has no administration in time range)   dextrose 50 % IV solution (has no administration in time range)   glucagon (rDNA) injection 1 mg (has no administration in time range)   dextrose 5 % solution (has no administration in time range)   ondansetron (ZOFRAN) injection 4 mg (has no administration in time range)   insulin regular (HUMULIN R;NOVOLIN R) 100 Units in sodium chloride 0.9 % 100 mL infusion (0.1 Units/kg/hr × 61.2 kg Intravenous New Bag 7/11/20 4900)   sodium chloride flush 0.9 % injection 10 mL (10 mLs Intravenous Given 7/11/20 4669)   promethazine (PHENERGAN) injection 12.5 mg (has no administration in time range)   iopamidol (ISOVUE-370) 76 % injection 100 mL (100 mLs Intravenous Given 7/11/20 8681)           Procedures:      Medical Decision Making:    Pt hyperglycemia type 1 dm, dka. Ph 7.4, waited for k+ before starting insulin, k+ 4.8, gtt started. Wbc noted, ua neg, cxr neg, and ct neg for emergent cause. Will admit to icu. hospitalist to admit, intensivist consulted      This patient's ED course included: a personal history and physicial eaxmination    This patient has remained hemodynamically stable during their ED course. Re-Evaluations:             Time: 300  Re-evaluation. Patients symptoms show no change  Repeat physical examination is improved        Consultations:             hospitalist  Critical care    Critical Care: 35 min        Counseling: The emergency provider has spoken with the patient and discussed todays results, in addition to providing specific details for the plan of care and counseling regarding the diagnosis and prognosis.   Questions are answered at this time and they are agreeable with the plan.       --------------------------------- IMPRESSION AND DISPOSITION ---------------------------------    IMPRESSION  1.  Diabetic ketoacidosis without coma associated with type 1 diabetes mellitus ([facility]        DISPOSITION  Disposition: Admit to CCU/ICU  Patient condition is serious                 Kamaljit Mireles MD  07/11/20 8284

## 2020-07-11 NOTE — H&P
[facility] History and Physical      CHIEF COMPLAINT:  Nausea and Vomiting    History of Present Illness: This is a pleasant 35-year-old female who presents to [facility] complaints of nausea and vomiting. All history has been obtained from the patient and review of medical records. She has a known history of type 1 diabetes. She follows Dr. Tucker York at the [facility] clinic of endocrinology. On January 10 she was switched over to an Omnipod infusion pump. Her pump settings were basal rate of 1.05 with a carb ratio of 1-10. She was placed on a Humalog infusion. She states that she was getting strange readings from her pump for the past few days. She apparently disconnected her pump. Unfortunately she did not take any long-acting insulin or any subcu after disconnecting her pump. Subsequently she began to feel nauseated and threw up. She presented the ED for this. There she was found a blood glucose of 476 with a beta hydroxybutyric acid greater than 4.50. Diagnosis was made of diabetic ketoacidosis. Patient also had some elevated lactic acid at 3.8. Because that she was admitted for treatment of her diabetic ketoacidosis. Informant(s) for H&P:    REVIEW OF SYSTEMS:  A comprehensive review of systems was negative except for: what is in the HPI      PMH:  Past Medical History:   Diagnosis Date    ADD (attention deficit disorder with hyperactivity)     Anxiety     Depression     Diabetes mellitus ([facility] [address]     Headache        Surgical History:  Past Surgical History:   Procedure Laterality Date    CHOLECYSTECTOMY      ENDOMETRIAL ABLATION      HYSTERECTOMY      TUBAL LIGATION         Medications Prior to Admission:    Prior to Admission medications    Medication Sig Start Date End Date Taking?  Authorizing Provider   PARoxetine (PAXIL) 30 MG tablet Take 20 mg by mouth nightly     Historical Provider, MD       Allergies:    Prozac [fluoxetine hcl] and Zoloft [sertraline hcl]    Social History:    reports that she has never smoked. She has never used smokeless tobacco. She reports that she does not drink alcohol or use drugs. Family History:   family history includes Cancer in her father; Diabetes in her mother; Elevated Lipids in her mother; Seizures in her sister. PHYSICAL EXAM:  Vitals:  /65   Pulse 104   Temp 98.6 °F (37 °C) (Oral)   Resp 18   Ht 5' 9\" (1.753 m)   Wt 135 lb (61.2 kg)   SpO2 98%   BMI 19.94 kg/m²     General Appearance: alert and oriented to person, place and time and in no acute distress  Skin: warm and dry  Head: normocephalic and atraumatic  Eyes: pupils equal, round, and reactive to light, extraocular eye movements intact, conjunctivae normal  Neck: neck supple and non tender without mass   Pulmonary/Chest: clear to auscultation bilaterally- no wheezes, rales or rhonchi, normal air movement, no respiratory distress  Cardiovascular: normal rate, normal S1 and S2 and no carotid bruits  Abdomen: soft, non-tender, non-distended, normal bowel sounds, no masses or organomegaly  Extremities: no cyanosis, no clubbing and no edema  Neurologic: no cranial nerve deficit and speech normal        LABS:  Recent Labs     07/10/20  2252 07/11/20  0317 07/11/20  0506   *  --   --    K 4.8  --   --    CL 86*  --   --    CO2 17*  --   --    BUN 22*  --   --    CREATININE 0.9  --   --    GLUCOSE 476* 330 296   CALCIUM 10.0  --   --        Recent Labs     07/10/20  2252   WBC 22.2*   RBC 5.18   HGB 15.0   HCT 44.7   MCV 86.3   MCH 29.0   MCHC 33.6   RDW 12.5      MPV 9.0       No results for input(s): POCGLU in the last 72 hours. Radiology:   CT ABDOMEN PELVIS W IV CONTRAST Additional Contrast? None   Final Result   1. There are 2 benign-appearing cysts within the right ovary.  The largest    measures 2 cm in its greatest diameter and exhibits peripheral enhancement    compatible with a hemorrhagic cyst. No further workup is needed. 2. Normal appendix    3. Focal fatty infiltration adjacent to the falciform fissure of the liver       This report has been electronically signed by Malgorzata Gold MD.      XR CHEST PORTABLE   Final Result      Negative one view chest.            Active Problems:    DKA, type 1, not at goal [facility])  Resolved Problems:    * No resolved hospital problems. *      ASSESSMENT/PLAN:    Diabetic ketoacidosis  -Secondary from discontinue insulin pump infusion  -We will continue DKA protocol  -Was started in January by Dr. Kika Samuel at the [facility] clinic  -Patient discontinued due to questionable malfunctioning  -Blood glucose was greater than 400 with elevated beta hydroxybutyric acid  -Placed on insulin infusion going to the ICU  -Initial settings were basal rate of 1.05 for 24 hours and a carb ratio 1:10 (Humalog)  - will bring in the insulin pump in the morning  -We will need to reinitiate at a previous rate  -Possible consult endocrinology for assistance    Type 1 insulin-dependent diabetes uncontrolled  -Last A1c in 2019 was 9.1  -We will repeat A1c to see how control was  -Continue insulin infusion for now  -Consider endocrinology consultation    Leukocytosis  -Suspect secondary demargination/stress reaction  -Urinalysis no sign of infection  -Patient is afebrile  -Chest x-ray is clear and CT scan negative for acute infectious process  -We will monitor for now  -Recheck lactic acid at 10AM    Major depression in remission  -No acute suicidal ideations  -Continue her Paxil      Code Status: FULL  DVT prophylaxis: LOVENOX      NOTE: This report was transcribed using voice recognition software. Every effort was made to ensure accuracy; however, inadvertent computerized transcription errors may be present.   Electronically signed by Katina Vee MD on 7/11/2020 at 5:46 AM

## 2020-07-11 NOTE — PROGRESS NOTES
Radiology Procedure Waiver   Name: Bjorn Perry  : 1985  MRN: [id number]    Date:  7/10/20    Time: 10:34 PM EDT    Benefits of immediately proceeding with Radiology exam(s) without pre-testing outweigh the risks or are not indicated as specified below and therefore the following is/are being waived:    [] Pregnancy test   [] Patients LMP on-time and regular.   [] Patient had Tubal Ligation or has other Contraception Device. [] Patient  is Menopausal or Premenarcheal.    [x] Patient had Full or Partial Hysterectomy. [] Protocol for Iodine allergy    [] MRI Questionnaire     [] BUN/Creatinine   [] Patient age w/no hx of renal dysfunction. [] Patient on Dialysis. [] Recent Normal Labs.   Electronically signed by Ameena Qureshi DO on 7/10/20 at 10:34 PM EDT

## 2020-07-12 VITALS
HEIGHT: 69 IN | OXYGEN SATURATION: 97 % | HEART RATE: 90 BPM | BODY MASS INDEX: 19.49 KG/M2 | WEIGHT: 131.61 LBS | SYSTOLIC BLOOD PRESSURE: 101 MMHG | DIASTOLIC BLOOD PRESSURE: 61 MMHG | RESPIRATION RATE: 16 BRPM | TEMPERATURE: 98.7 F

## 2020-07-12 LAB
ANION GAP SERPL CALCULATED.3IONS-SCNC: 13 MMOL/L (ref 7–16)
BASOPHILS ABSOLUTE: 0.09 E9/L (ref 0–0.2)
BASOPHILS RELATIVE PERCENT: 0.9 % (ref 0–2)
BUN BLDV-MCNC: 14 MG/DL (ref 6–20)
CALCIUM SERPL-MCNC: 8.3 MG/DL (ref 8.6–10.2)
CHLORIDE BLD-SCNC: 105 MMOL/L (ref 98–107)
CO2: 18 MMOL/L (ref 22–29)
CREAT SERPL-MCNC: 0.7 MG/DL (ref 0.5–1)
EOSINOPHILS ABSOLUTE: 0.16 E9/L (ref 0.05–0.5)
EOSINOPHILS RELATIVE PERCENT: 1.5 % (ref 0–6)
GFR AFRICAN AMERICAN: >60
GFR NON-AFRICAN AMERICAN: >60 ML/MIN/1.73
GLUCOSE BLD-MCNC: 226 MG/DL (ref 74–99)
HCT VFR BLD CALC: 37.7 % (ref 34–48)
HEMOGLOBIN: 12.4 G/DL (ref 11.5–15.5)
IMMATURE GRANULOCYTES #: 0.06 E9/L
IMMATURE GRANULOCYTES %: 0.6 % (ref 0–5)
LYMPHOCYTES ABSOLUTE: 2.56 E9/L (ref 1.5–4)
LYMPHOCYTES RELATIVE PERCENT: 24.4 % (ref 20–42)
MAGNESIUM: 2 MG/DL (ref 1.6–2.6)
MCH RBC QN AUTO: 28.7 PG (ref 26–35)
MCHC RBC AUTO-ENTMCNC: 32.9 % (ref 32–34.5)
MCV RBC AUTO: 87.3 FL (ref 80–99.9)
METER GLUCOSE: 213 MG/DL (ref 74–99)
MONOCYTES ABSOLUTE: 0.56 E9/L (ref 0.1–0.95)
MONOCYTES RELATIVE PERCENT: 5.3 % (ref 2–12)
MRSA CULTURE ONLY: NORMAL
NEUTROPHILS ABSOLUTE: 7.05 E9/L (ref 1.8–7.3)
NEUTROPHILS RELATIVE PERCENT: 67.3 % (ref 43–80)
PDW BLD-RTO: 12.8 FL (ref 11.5–15)
PHOSPHORUS: 1.9 MG/DL (ref 2.5–4.5)
PLATELET # BLD: 225 E9/L (ref 130–450)
PMV BLD AUTO: 8.7 FL (ref 7–12)
POTASSIUM SERPL-SCNC: 4.2 MMOL/L (ref 3.5–5)
RBC # BLD: 4.32 E12/L (ref 3.5–5.5)
SODIUM BLD-SCNC: 136 MMOL/L (ref 132–146)
WBC # BLD: 10.5 E9/L (ref 4.5–11.5)

## 2020-07-12 PROCEDURE — 80048 BASIC METABOLIC PNL TOTAL CA: CPT

## 2020-07-12 PROCEDURE — 84100 ASSAY OF PHOSPHORUS: CPT

## 2020-07-12 PROCEDURE — 6360000002 HC RX W HCPCS: Performed by: INTERNAL MEDICINE

## 2020-07-12 PROCEDURE — 6370000000 HC RX 637 (ALT 250 FOR IP): Performed by: INTERNAL MEDICINE

## 2020-07-12 PROCEDURE — 82962 GLUCOSE BLOOD TEST: CPT

## 2020-07-12 PROCEDURE — 83735 ASSAY OF MAGNESIUM: CPT

## 2020-07-12 PROCEDURE — 36415 COLL VENOUS BLD VENIPUNCTURE: CPT

## 2020-07-12 PROCEDURE — 85025 COMPLETE CBC W/AUTO DIFF WBC: CPT

## 2020-07-12 PROCEDURE — 99239 HOSP IP/OBS DSCHRG MGMT >30: CPT | Performed by: INTERNAL MEDICINE

## 2020-07-12 RX ORDER — BLOOD-GLUCOSE METER
1 KIT MISCELLANEOUS 3 TIMES DAILY
Qty: 1 KIT | Refills: 0 | Status: SHIPPED | OUTPATIENT
Start: 2020-07-12

## 2020-07-12 RX ORDER — INSULIN GLARGINE 100 [IU]/ML
13 INJECTION, SOLUTION SUBCUTANEOUS NIGHTLY
Qty: 5 PEN | Refills: 3 | Status: SHIPPED | OUTPATIENT
Start: 2020-07-12

## 2020-07-12 RX ORDER — LANCETS 30 GAUGE
EACH MISCELLANEOUS
Qty: 100 EACH | Refills: 0 | Status: SHIPPED | OUTPATIENT
Start: 2020-07-12

## 2020-07-12 RX ADMIN — INSULIN LISPRO 4 UNITS: 100 INJECTION, SOLUTION INTRAVENOUS; SUBCUTANEOUS at 08:33

## 2020-07-12 RX ADMIN — ENOXAPARIN SODIUM 40 MG: 40 INJECTION SUBCUTANEOUS at 08:33

## 2020-07-12 ASSESSMENT — PAIN SCALES - GENERAL: PAINLEVEL_OUTOF10: 0

## 2020-07-12 NOTE — DISCHARGE SUMMARY
[facility] Physician Discharge Summary       Jake Cee MD  [address] [address]  [phone]            Activity level: As tolerated     Dispo: home    Condition on discharge: Stable     Patient ID:  Ata Hernandez  [id number]  46 y.o.  1985    Admit date: 7/10/2020    Discharge date and time:  2020  10:17 AM    Admission Diagnoses: Active Problems:    DKA, type 1, not at goal [facility])  Resolved Problems:    * No resolved hospital problems. *      Discharge Diagnoses: Active Problems:    DKA, type 1, not at goal [facility])  Resolved Problems:    * No resolved hospital problems. *      Consults:  IP CONSULT TO INTERNAL MEDICINE  IP CONSULT TO CRITICAL CARE  IP CONSULT TO ENDOCRINOLOGY    Procedures:     Hospital Course:   Patient Ata Hernandez is a 28 y.o. presented with nausea and vomiting, found to be in  DKA, type 1, not at goal ([address] [E10.10]  DKA, type 1, not at goal [facility]) [E10.10], we admitted the patient for eval and treated her for the followin. DKA1, started the patient on DKA protocol with IVF and insulin drip, anion gap on presentation was 28, which is closed now, resolved  2. DM I, usually on insulin pump, she stopped it a couple of days ago which would have been the trigger for DKA. We started the patient on  lantus and sliding scale, I adjusted the dose to be discharged on 13 units of lantus and sliding scale insulin, until she follows up with her endocrinologist and resolve the problem with her pump. Patient was discharged stable.     Discharge Exam:    General Appearance: alert and oriented to person, place and time and in no acute distress  Skin: warm and dry  Head: normocephalic and atraumatic  Eyes: pupils equal, round, and reactive to light, extraocular eye movements intact, conjunctivae normal  Neck: neck supple and non tender without mass   Pulmonary/Chest: clear to auscultation bilaterally- no wheezes, rales or rhonchi, normal air movement, no respiratory distress  Cardiovascular: normal rate, normal S1 and S2 and no carotid bruits  Abdomen: soft, non-tender, non-distended, normal bowel sounds, no masses or organomegaly  Extremities: no cyanosis, no clubbing and no edema  Neurologic: no cranial nerve deficit and speech normal    I/O last 3 completed shifts: In: 2190 [P.O.:200; I.V.:1440; IV Piggyback:550]  Out: 1000 [Urine:1000]  No intake/output data recorded. LABS:  Recent Labs     07/11/20  1215 07/11/20  1635 07/12/20  0641    136 136   K 4.2 4.2 4.2    106 105   CO2 17* 18* 18*   BUN 13 13 14   CREATININE 0.8 0.8 0.7   GLUCOSE 180* 236* 226*   CALCIUM 8.0* 8.0* 8.3*       Recent Labs     07/10/20  2252 07/12/20  0641   WBC 22.2* 10.5   RBC 5.18 4.32   HGB 15.0 12.4   HCT 44.7 37.7   MCV 86.3 87.3   MCH 29.0 28.7   MCHC 33.6 32.9   RDW 12.5 12.8    225   MPV 9.0 8.7       No results for input(s): POCGLU in the last 72 hours. Imaging:  Ct Abdomen Pelvis W Iv Contrast Additional Contrast? None    Result Date: 7/11/2020  PROCEDURE INFORMATION: Exam: CT Abdomen And Pelvis With Contrast Exam date and time: 7/10/2020 10:45 PM Age: 28years old Clinical indication: Abdominal pain; Prior surgery; Surgery type: [place], partial hyster; Additional info: Periumbilical pain TECHNIQUE: Imaging protocol: Computed tomography of the abdomen and pelvis with intravenous contrast. Radiation optimization: All CT scans at this facility use at least one of these dose optimization techniques: automated exposure control; mA and/or kV adjustment per patient size (includes targeted exams where dose is matched to clinical indication); or iterative reconstruction. Contrast material: [id number]; Contrast volume: 100 ml; Contrast route: INTRAVENOUS (IV);  COMPARISON: CT ABDOMEN PELVIS W CONTRAST 4/27/2019 11:27 AM FINDINGS: Liver: Hypoattenuation seen adjacent to the falciform fissure compatible with some focal fatty infiltration.  Gallbladder and bile ducts: Status post cholecystectomy. Pancreas: Normal. No ductal dilation. Spleen: Normal. No splenomegaly. Adrenals: Normal. No mass. Kidneys and ureters: Normal. No hydronephrosis. Stomach and bowel: Unremarkable. No obstruction. No mucosal thickening. Appendix: The appendix is visualized and is normal in configuration. Intraperitoneal space: Unremarkable. No free air. No significant fluid collection. Vasculature: Unremarkable. No abdominal aortic aneurysm. Lymph nodes: Unremarkable. No enlarged lymph nodes. Bladder: Unremarkable as visualized. Reproductive: There are 2 contiguous cystic masses seen associated with the right ovary. A 1.3 x 1.6 x 1.5 cm simple appearing cyst is seen. And adjacent 1.5 x 2.0 x 1.8 cm cyst is seen exhibiting some peripheral enhancement likely representing a small hemorrhagic cyst. The uterus is retroflexed. Bones/joints: Unremarkable. No acute fracture. Soft tissues: Unremarkable. 1. There are 2 benign-appearing cysts within the right ovary. The largest measures 2 cm in its greatest diameter and exhibits peripheral enhancement compatible with a hemorrhagic cyst. No further workup is needed. 2. Normal appendix 3. Focal fatty infiltration adjacent to the falciform fissure of the liver This report has been electronically signed by Carmelita Dumont MD.    Xr Chest Portable    Result Date: 7/10/2020  Single frontal projection (one view) of the chest. COMPARISON: May 30, 2017. FINDINGS: The heart, lungs, mediastinum and regional skeleton are unremarkable. The costophrenic angles are sharp and clear. There is no evidence of mediastinal shift. The heart is not enlarged. There is no evidence of hilar adenopathy. Lungs are negative for evidence of acute airspace consolidation, effusion, atelectasis, pneumothorax, pathologic nodularity or interstitial thickening.  Regional bone density and trabecular pattern are normal.     Negative one view chest.      Patient Instructions:      Medication

## 2020-07-12 NOTE — PLAN OF CARE
Problem: Discharge Planning:  Goal: Discharged to appropriate level of care  Description: Discharged to appropriate level of care  Outcome: Met This Shift     Problem: Serum Glucose Level - Abnormal:  Goal: Ability to maintain appropriate glucose levels will improve  Description: Ability to maintain appropriate glucose levels will improve  Outcome: Met This Shift     Problem: Pain:  Goal: Pain level will decrease  Description: Pain level will decrease  Outcome: Met This Shift

## 2020-07-12 NOTE — PROGRESS NOTES
P Quality Flow/Interdisciplinary Rounds Progress Note        Quality Flow Rounds held on July 12, 2020    Disciplines Attending:  Bedside Nurse, ,  and Nursing Unit Leadership    Felice Pabon was admitted on 7/10/2020  8:57 PM    Anticipated Discharge Date:  Expected Discharge Date: 07/13/20    Disposition:    Anuel Score:  Anuel Scale Score: 22    Readmission Risk              Risk of Unplanned Readmission:        10           Discussed patient goal for the day, patient clinical progression, and barriers to discharge.   The following Goal(s) of the Day/Commitment(s) have been identified:  Pain Control      Rick Stewart  July 12, 2020

## 2021-04-15 ENCOUNTER — OFFICE VISIT (OUTPATIENT)
Dept: PODIATRY | Age: 36
End: 2021-04-15
Payer: COMMERCIAL

## 2021-04-15 VITALS — WEIGHT: 131 LBS | HEIGHT: 70 IN | BODY MASS INDEX: 18.75 KG/M2

## 2021-04-15 DIAGNOSIS — L03.031 CELLULITIS OF RIGHT TOE: ICD-10-CM

## 2021-04-15 DIAGNOSIS — E10.9 TYPE 1 DIABETES MELLITUS WITHOUT COMPLICATION (HCC): ICD-10-CM

## 2021-04-15 DIAGNOSIS — L60.0 OC (ONYCHOCRYPTOSIS): Primary | ICD-10-CM

## 2021-04-15 DIAGNOSIS — M79.674 PAIN OF TOE OF RIGHT FOOT: ICD-10-CM

## 2021-04-15 PROCEDURE — 1036F TOBACCO NON-USER: CPT | Performed by: PODIATRIST

## 2021-04-15 PROCEDURE — 3046F HEMOGLOBIN A1C LEVEL >9.0%: CPT | Performed by: PODIATRIST

## 2021-04-15 PROCEDURE — 2022F DILAT RTA XM EVC RTNOPTHY: CPT | Performed by: PODIATRIST

## 2021-04-15 PROCEDURE — G8427 DOCREV CUR MEDS BY ELIG CLIN: HCPCS | Performed by: PODIATRIST

## 2021-04-15 PROCEDURE — 99203 OFFICE O/P NEW LOW 30 MIN: CPT | Performed by: PODIATRIST

## 2021-04-15 PROCEDURE — G8420 CALC BMI NORM PARAMETERS: HCPCS | Performed by: PODIATRIST

## 2021-04-15 PROCEDURE — 11750 EXCISION NAIL&NAIL MATRIX: CPT | Performed by: PODIATRIST

## 2021-04-15 NOTE — PROGRESS NOTES
Patient is in today as a new patient for issues with her right great toe. Patient noticed the paronychia about a week ago. Patient says she was cutting her nails and barely cut her skin. She says the area is swollen, and painful, with drainage.  pcp is Rober Kerns MD  Last ov 4/14/2020

## 2021-04-16 NOTE — PROGRESS NOTES
Reactions    Prozac [Fluoxetine Hcl] Hives    Zoloft [Sertraline Hcl] Hives       Vitals:    04/15/21 1536   Weight: 131 lb (59.4 kg)   Height: 5' 10\" (1.778 m)        Past Medical History:   Diagnosis Date    ADD (attention deficit disorder with hyperactivity)     Anxiety     Depression     Diabetes mellitus ([address]     Headache      Family History   Problem Relation Age of Onset    Diabetes Mother     Elevated Lipids Mother     Cancer Father         testicular cancer possibly    Seizures Sister      Past Surgical History:   Procedure Laterality Date    CHOLECYSTECTOMY      ENDOMETRIAL ABLATION      HYSTERECTOMY      TUBAL LIGATION       Social History     Tobacco Use    Smoking status: Never Smoker    Smokeless tobacco: Never Used   Substance Use Topics    Alcohol use: No     Comment: occasional    Drug use: No           Diagnostic studies:    No results found. Procedures:    Matrixectomy phenol alcohol    I discussed with the patient the procedure in extensive detail. We discussed the potential for recurrence, infection, prolonged drainage, delayed healing, overcorrection, undercorrection, recurrence of the deformity and potential need for further treatment. The patient understood. All questions were answered and all concerns were addressed. No guarantees were given. Upon receiving consent, the patient was brought to the treatment room and placed in the supine position where local anesthesia was achieved. The local anesthesia consisted of 4 cc of 2% Xylocaine plain to the right great toe. The right great toe was prepped and draped in the usual fashion. Hemostasis was achieved via Penrose drain. At this point in time, a partial matrixectomy was performed along the medial aspect of the right great toe. A spatula was used to free the medial border of the right great toenail. Next, an English anvil was used to transect the nail border to the eponychium.   Next, a 58 blade was utilized to transect the nail border proximal to the eponychium. The nail border was removed utilizing straight hemostats. Three 30 second applications of phenol were utilized to perform a chemical matrixectomy. The area was flushed with alcohol and dressed with topical antibiotic/Adaptic, 4x4, Ponce, and Coban in a compressive fashion. The tourniquet was released and normal vascular capillary fill time was noted to return to the end of the digit. The patient tolerated the procedure and anesthesia well and left the office with vital signs stable and vascular status intact. The patient was given home going instructions and will reappoint for postperative follow up. Exam:  VASCULAR: Pedal pulses palpable right foot. Capillary fill time brisk digits 1 through 5 right foot  NEUROLOGICAL: Epicritic sensations intact right foot. DERMATOLOGICAL: Medial border right great toe is incurvated with tenderness noted to palpation. Mild erythema and edema present with hyper granular tissue noted right great toe. No ascending cellulitic issues noted right foot. MUSCULOSKELETAL: Noncontributory    Plan Per Assessment  Viridiana Grissom was seen today for toe pain. Diagnoses and all orders for this visit:    OC (onychocryptosis)    Pain of toe of right foot    Cellulitis of right toe    Type 1 diabetes mellitus without complication ([address]        1. New patient evaluation and management  2. Partial nail matrix procedure performed medial border right great toe as described above to patient tolerance. Patient was advised continued use of her oral antibiotics until prescription is finished. We did utilize a small amount of silver nitrate to treat the hyper granular tissue medial nail border. 3. We did advised the patient on appropriate diabetic foot care techniques to prevent reoccurrence of issues. 4. Patient will be followed up in 1 week's time for continued evaluation and management.   She was advised to call the office with any questions or concerns in the interim. Seen By:    Kenny Parks DPM    Electronically signed by Kenny Parks DPM on 4/16/2021 at 10:42 AM      This note was created using voice recognition software. The note was reviewed however may contain grammatical errors.

## 2021-04-21 ENCOUNTER — OFFICE VISIT (OUTPATIENT)
Dept: PODIATRY | Age: 36
End: 2021-04-21

## 2021-04-21 VITALS — HEIGHT: 70 IN | WEIGHT: 131 LBS | BODY MASS INDEX: 18.75 KG/M2

## 2021-04-21 DIAGNOSIS — E10.9 TYPE 1 DIABETES MELLITUS WITHOUT COMPLICATION (HCC): ICD-10-CM

## 2021-04-21 DIAGNOSIS — L60.0 OC (ONYCHOCRYPTOSIS): Primary | ICD-10-CM

## 2021-04-21 PROCEDURE — 99024 POSTOP FOLLOW-UP VISIT: CPT | Performed by: PODIATRIST

## 2021-04-21 NOTE — PROGRESS NOTES
21     Anita Wick    : 1985   Sex: female    Age: 28 y.o. Patient's PCP/Provider is:  Sam Aguilar MD    Subjective:  Patient is seen today for follow-up regarding continued care matrix ectomy site right great toe. Patient has noticed significant improvement since the procedure was performed last week. Patient is still taking the oral antibiotic as recommended with noted improvement. She is having minimal discomfort at the matrix site right great toe. She denies any nausea, vomiting, fever, chills. Chief Complaint   Patient presents with    Post-Op Check     right great matrix       ROS:  Const: Positives and pertinent negatives as per HPI. Musculo: Denies symptoms other than stated above. Neuro: Denies symptoms other than stated above. Skin: Denies symptoms other than stated above. Current Medications:    Current Outpatient Medications:     insulin glargine (LANTUS SOLOSTAR) 100 UNIT/ML injection pen, Inject 13 Units into the skin nightly, Disp: 5 pen, Rfl: 3    blood glucose test strips (ASCENSIA AUTODISC VI;ONE TOUCH ULTRA TEST VI) strip, 1 each by Does not apply route 2 times daily OK substitute per patient or insurance preference. , Disp: 100 strip, Rfl: 5    glucose monitoring kit (FREESTYLE) monitoring kit, 1 kit by Does not apply route 3 times daily, Disp: 1 kit, Rfl: 0    Insulin Pen Needle (NOVOFINE) 30G X 8 MM MISC, 1 each by Does not apply route 3 times daily, Disp: 100 each, Rfl: 5    Lancets MISC, Check blood sugar before and after meal breakfast lunch and dinner, log the times and the results, Disp: 100 each, Rfl: 0    insulin lispro (HUMALOG) 100 UNIT/ML pen, Inject 12 Units into the skin 3 times daily (before meals) On sliding scale, Disp: 1 pen, Rfl: 5    PARoxetine (PAXIL) 30 MG tablet, Take 20 mg by mouth nightly , Disp: , Rfl:     Allergies:   Allergies   Allergen Reactions    Prozac [Fluoxetine Hcl] Hives    Zoloft [Sertraline Hcl] Hives Vitals:    04/21/21 0726   Weight: 131 lb (59.4 kg)   Height: 5' 10\" (1.778 m)       Exam:  Neurovascular status unchanged. Medial border right great toe is stable at this time. Erythema and edema is resolved right great toe. No signs of infection noted right great toe. Minimal tenderness noted to palpation matrix site medial border right great toe. Diagnostic Studies:     No results found. Procedures:    None    Plan Per Assessment  Karli Schmidt was seen today for post-op check. Diagnoses and all orders for this visit:    OC (onychocryptosis)    Type 1 diabetes mellitus without complication ([facility] [address]      1. Post matrix evaluation and management  2. We did advised the patient on appropriate skin care techniques to allow for continued improvement. 3. Did discuss additional diabetic foot care techniques to prevent reoccurrence of symptoms. 4. Patient will be followed up at a later date if any further Podiatric issues arise. Seen By:    Gustabo Bingham DPM    Electronically signed by Gustabo Bingham DPM on 4/21/2021 at 7:41 AM    This note was created using voice recognition software. The note was reviewed however may contain grammatical errors.

## 2021-04-21 NOTE — PROGRESS NOTES
Patient is here for right great ingrown toe nail. Patient states it is a lot better than it was.  Malaika Shaw MD LOV 1 year ago  Electronically signed by Luigi River LPN on 1/29/5224 at 8:03 AM

## 2024-03-19 DIAGNOSIS — G60.9 IDIOPATHIC NEUROPATHY: Primary | ICD-10-CM

## 2024-06-23 ENCOUNTER — HOSPITAL ENCOUNTER (INPATIENT)
Age: 39
LOS: 2 days | Discharge: HOME | DRG: 919 | End: 2024-06-25
Attending: EMERGENCY MEDICINE | Admitting: INTERNAL MEDICINE
Payer: COMMERCIAL

## 2024-06-23 ENCOUNTER — APPOINTMENT (OUTPATIENT)
Dept: GENERAL RADIOLOGY | Age: 39
DRG: 919 | End: 2024-06-23
Payer: COMMERCIAL

## 2024-06-23 DIAGNOSIS — R11.2 NAUSEA AND VOMITING, UNSPECIFIED VOMITING TYPE: ICD-10-CM

## 2024-06-23 DIAGNOSIS — E10.10 TYPE 1 DIABETES MELLITUS WITH KETOACIDOSIS WITHOUT COMA (HCC): Primary | ICD-10-CM

## 2024-06-23 DIAGNOSIS — E86.0 DEHYDRATION: ICD-10-CM

## 2024-06-23 LAB
ALBUMIN SERPL-MCNC: 4.8 G/DL (ref 3.5–5.2)
ALP SERPL-CCNC: 100 U/L (ref 35–104)
ALT SERPL-CCNC: 13 U/L (ref 0–32)
ANION GAP SERPL CALCULATED.3IONS-SCNC: 11 MMOL/L (ref 7–16)
ANION GAP SERPL CALCULATED.3IONS-SCNC: 21 MMOL/L (ref 7–16)
ANION GAP SERPL CALCULATED.3IONS-SCNC: 31 MMOL/L (ref 7–16)
AST SERPL-CCNC: 20 U/L (ref 0–31)
B-OH-BUTYR SERPL-MCNC: >4.5 MMOL/L (ref 0.02–0.27)
BACTERIA URNS QL MICRO: ABNORMAL
BASOPHILS # BLD: 0 K/UL (ref 0–0.2)
BASOPHILS NFR BLD: 0 % (ref 0–2)
BILIRUB SERPL-MCNC: 0.6 MG/DL (ref 0–1.2)
BILIRUB UR QL STRIP: NEGATIVE
BUN SERPL-MCNC: 25 MG/DL (ref 6–20)
BUN SERPL-MCNC: 29 MG/DL (ref 6–20)
BUN SERPL-MCNC: 35 MG/DL (ref 6–20)
CALCIUM SERPL-MCNC: 7.3 MG/DL (ref 8.6–10.2)
CALCIUM SERPL-MCNC: 7.8 MG/DL (ref 8.6–10.2)
CALCIUM SERPL-MCNC: 9.6 MG/DL (ref 8.6–10.2)
CHLORIDE SERPL-SCNC: 105 MMOL/L (ref 98–107)
CHLORIDE SERPL-SCNC: 115 MMOL/L (ref 98–107)
CHLORIDE SERPL-SCNC: 96 MMOL/L (ref 98–107)
CLARITY UR: CLEAR
CO2 SERPL-SCNC: 11 MMOL/L (ref 22–29)
CO2 SERPL-SCNC: 12 MMOL/L (ref 22–29)
CO2 SERPL-SCNC: 15 MMOL/L (ref 22–29)
COLOR UR: YELLOW
CREAT SERPL-MCNC: 0.8 MG/DL (ref 0.5–1)
CREAT SERPL-MCNC: 0.8 MG/DL (ref 0.5–1)
CREAT SERPL-MCNC: 0.9 MG/DL (ref 0.5–1)
CRP SERPL HS-MCNC: <3 MG/L (ref 0–5)
EOSINOPHIL # BLD: 0 K/UL (ref 0.05–0.5)
EOSINOPHILS RELATIVE PERCENT: 0 % (ref 0–6)
EPI CELLS #/AREA URNS HPF: ABNORMAL /HPF
ERYTHROCYTE [DISTWIDTH] IN BLOOD BY AUTOMATED COUNT: 12.7 % (ref 11.5–15)
GFR, ESTIMATED: 90 ML/MIN/1.73M2
GFR, ESTIMATED: >90 ML/MIN/1.73M2
GFR, ESTIMATED: >90 ML/MIN/1.73M2
GLUCOSE BLD-MCNC: 177 MG/DL (ref 74–99)
GLUCOSE BLD-MCNC: 193 MG/DL (ref 74–99)
GLUCOSE BLD-MCNC: 209 MG/DL (ref 74–99)
GLUCOSE BLD-MCNC: 211 MG/DL (ref 74–99)
GLUCOSE BLD-MCNC: 233 MG/DL (ref 74–99)
GLUCOSE BLD-MCNC: 260 MG/DL (ref 74–99)
GLUCOSE BLD-MCNC: 302 MG/DL (ref 74–99)
GLUCOSE BLD-MCNC: 344 MG/DL (ref 74–99)
GLUCOSE SERPL-MCNC: 207 MG/DL (ref 74–99)
GLUCOSE SERPL-MCNC: 266 MG/DL (ref 74–99)
GLUCOSE SERPL-MCNC: 466 MG/DL (ref 74–99)
GLUCOSE UR STRIP-MCNC: >=1000 MG/DL
HBA1C MFR BLD: 10.1 % (ref 4–5.6)
HCT VFR BLD AUTO: 44.7 % (ref 34–48)
HGB BLD-MCNC: 14.2 G/DL (ref 11.5–15.5)
HGB UR QL STRIP.AUTO: NEGATIVE
KETONES UR STRIP-MCNC: >80 MG/DL
LACTATE BLDV-SCNC: 1.1 MMOL/L (ref 0.5–2.2)
LACTATE BLDV-SCNC: 1.6 MMOL/L (ref 0.5–2.2)
LACTATE BLDV-SCNC: 3.3 MMOL/L (ref 0.5–2.2)
LEUKOCYTE ESTERASE UR QL STRIP: NEGATIVE
LIPASE SERPL-CCNC: 9 U/L (ref 13–60)
LYMPHOCYTES NFR BLD: 0.48 K/UL (ref 1.5–4)
LYMPHOCYTES RELATIVE PERCENT: 2 % (ref 20–42)
MAGNESIUM SERPL-MCNC: 1.7 MG/DL (ref 1.6–2.6)
MAGNESIUM SERPL-MCNC: 1.8 MG/DL (ref 1.6–2.6)
MCH RBC QN AUTO: 28.5 PG (ref 26–35)
MCHC RBC AUTO-ENTMCNC: 31.8 G/DL (ref 32–34.5)
MCV RBC AUTO: 89.8 FL (ref 80–99.9)
METAMYELOCYTES ABSOLUTE COUNT: 0.24 K/UL (ref 0–0.12)
METAMYELOCYTES: 1 % (ref 0–1)
MONOCYTES NFR BLD: 1.21 K/UL (ref 0.1–0.95)
MONOCYTES NFR BLD: 4 % (ref 2–12)
NEUTROPHILS NFR BLD: 93 % (ref 43–80)
NEUTS SEG NFR BLD: 26.16 K/UL (ref 1.8–7.3)
NITRITE UR QL STRIP: NEGATIVE
PH UR STRIP: 6 [PH] (ref 5–9)
PH VENOUS: 7.12 (ref 7.35–7.45)
PHOSPHATE SERPL-MCNC: 2.4 MG/DL (ref 2.5–4.5)
PHOSPHATE SERPL-MCNC: 2.6 MG/DL (ref 2.5–4.5)
PHOSPHATE SERPL-MCNC: 5.7 MG/DL (ref 2.5–4.5)
PLATELET # BLD AUTO: 307 K/UL (ref 130–450)
PMV BLD AUTO: 9.1 FL (ref 7–12)
POTASSIUM SERPL-SCNC: 3.9 MMOL/L (ref 3.5–5)
POTASSIUM SERPL-SCNC: 4.2 MMOL/L (ref 3.5–5)
POTASSIUM SERPL-SCNC: 4.4 MMOL/L (ref 3.5–5)
PROCALCITONIN SERPL-MCNC: 0.95 NG/ML (ref 0–0.08)
PROT SERPL-MCNC: 7.6 G/DL (ref 6.4–8.3)
PROT UR STRIP-MCNC: NEGATIVE MG/DL
RBC # BLD AUTO: 4.98 M/UL (ref 3.5–5.5)
RBC # BLD: ABNORMAL 10*6/UL
RBC #/AREA URNS HPF: ABNORMAL /HPF
SODIUM SERPL-SCNC: 138 MMOL/L (ref 132–146)
SODIUM SERPL-SCNC: 138 MMOL/L (ref 132–146)
SODIUM SERPL-SCNC: 141 MMOL/L (ref 132–146)
SP GR UR STRIP: 1.02 (ref 1–1.03)
TROPONIN I SERPL HS-MCNC: <6 NG/L (ref 0–9)
UROBILINOGEN UR STRIP-ACNC: 0.2 EU/DL (ref 0–1)
WBC #/AREA URNS HPF: ABNORMAL /HPF
WBC OTHER # BLD: 28.1 K/UL (ref 4.5–11.5)

## 2024-06-23 PROCEDURE — 82800 BLOOD PH: CPT

## 2024-06-23 PROCEDURE — 99285 EMERGENCY DEPT VISIT HI MDM: CPT

## 2024-06-23 PROCEDURE — 86140 C-REACTIVE PROTEIN: CPT

## 2024-06-23 PROCEDURE — 87086 URINE CULTURE/COLONY COUNT: CPT

## 2024-06-23 PROCEDURE — 6370000000 HC RX 637 (ALT 250 FOR IP): Performed by: STUDENT IN AN ORGANIZED HEALTH CARE EDUCATION/TRAINING PROGRAM

## 2024-06-23 PROCEDURE — 84145 PROCALCITONIN (PCT): CPT

## 2024-06-23 PROCEDURE — 84100 ASSAY OF PHOSPHORUS: CPT

## 2024-06-23 PROCEDURE — 81001 URINALYSIS AUTO W/SCOPE: CPT

## 2024-06-23 PROCEDURE — 6360000002 HC RX W HCPCS: Performed by: STUDENT IN AN ORGANIZED HEALTH CARE EDUCATION/TRAINING PROGRAM

## 2024-06-23 PROCEDURE — 2580000003 HC RX 258

## 2024-06-23 PROCEDURE — 2580000003 HC RX 258: Performed by: INTERNAL MEDICINE

## 2024-06-23 PROCEDURE — 87081 CULTURE SCREEN ONLY: CPT

## 2024-06-23 PROCEDURE — 82962 GLUCOSE BLOOD TEST: CPT

## 2024-06-23 PROCEDURE — 2500000003 HC RX 250 WO HCPCS: Performed by: STUDENT IN AN ORGANIZED HEALTH CARE EDUCATION/TRAINING PROGRAM

## 2024-06-23 PROCEDURE — 96374 THER/PROPH/DIAG INJ IV PUSH: CPT

## 2024-06-23 PROCEDURE — 2580000003 HC RX 258: Performed by: STUDENT IN AN ORGANIZED HEALTH CARE EDUCATION/TRAINING PROGRAM

## 2024-06-23 PROCEDURE — 93005 ELECTROCARDIOGRAM TRACING: CPT | Performed by: STUDENT IN AN ORGANIZED HEALTH CARE EDUCATION/TRAINING PROGRAM

## 2024-06-23 PROCEDURE — 83036 HEMOGLOBIN GLYCOSYLATED A1C: CPT

## 2024-06-23 PROCEDURE — 83735 ASSAY OF MAGNESIUM: CPT

## 2024-06-23 PROCEDURE — 71045 X-RAY EXAM CHEST 1 VIEW: CPT

## 2024-06-23 PROCEDURE — 2000000000 HC ICU R&B

## 2024-06-23 PROCEDURE — 85025 COMPLETE CBC W/AUTO DIFF WBC: CPT

## 2024-06-23 PROCEDURE — 83605 ASSAY OF LACTIC ACID: CPT

## 2024-06-23 PROCEDURE — 99291 CRITICAL CARE FIRST HOUR: CPT

## 2024-06-23 PROCEDURE — 80053 COMPREHEN METABOLIC PANEL: CPT

## 2024-06-23 PROCEDURE — 84484 ASSAY OF TROPONIN QUANT: CPT

## 2024-06-23 PROCEDURE — 82010 KETONE BODYS QUAN: CPT

## 2024-06-23 PROCEDURE — 96372 THER/PROPH/DIAG INJ SC/IM: CPT

## 2024-06-23 PROCEDURE — 6360000002 HC RX W HCPCS

## 2024-06-23 PROCEDURE — 96361 HYDRATE IV INFUSION ADD-ON: CPT

## 2024-06-23 PROCEDURE — 83690 ASSAY OF LIPASE: CPT

## 2024-06-23 PROCEDURE — 80048 BASIC METABOLIC PNL TOTAL CA: CPT

## 2024-06-23 RX ORDER — SODIUM CHLORIDE 9 MG/ML
INJECTION, SOLUTION INTRAVENOUS CONTINUOUS
Status: DISCONTINUED | OUTPATIENT
Start: 2024-06-23 | End: 2024-06-24

## 2024-06-23 RX ORDER — PROCHLORPERAZINE EDISYLATE 5 MG/ML
10 INJECTION INTRAMUSCULAR; INTRAVENOUS EVERY 6 HOURS PRN
Status: DISCONTINUED | OUTPATIENT
Start: 2024-06-23 | End: 2024-06-24

## 2024-06-23 RX ORDER — DEXTROSE MONOHYDRATE, SODIUM CHLORIDE, AND POTASSIUM CHLORIDE 50; 1.49; 4.5 G/1000ML; G/1000ML; G/1000ML
INJECTION, SOLUTION INTRAVENOUS CONTINUOUS PRN
Status: DISCONTINUED | OUTPATIENT
Start: 2024-06-23 | End: 2024-06-23

## 2024-06-23 RX ORDER — MAGNESIUM SULFATE IN WATER 40 MG/ML
2000 INJECTION, SOLUTION INTRAVENOUS PRN
Status: DISCONTINUED | OUTPATIENT
Start: 2024-06-23 | End: 2024-06-24

## 2024-06-23 RX ORDER — 0.9 % SODIUM CHLORIDE 0.9 %
1000 INTRAVENOUS SOLUTION INTRAVENOUS ONCE
Status: COMPLETED | OUTPATIENT
Start: 2024-06-23 | End: 2024-06-23

## 2024-06-23 RX ORDER — 0.9 % SODIUM CHLORIDE 0.9 %
15 INTRAVENOUS SOLUTION INTRAVENOUS ONCE
Status: COMPLETED | OUTPATIENT
Start: 2024-06-23 | End: 2024-06-23

## 2024-06-23 RX ORDER — DEXTROSE MONOHYDRATE AND SODIUM CHLORIDE 5; .45 G/100ML; G/100ML
INJECTION, SOLUTION INTRAVENOUS CONTINUOUS PRN
Status: DISCONTINUED | OUTPATIENT
Start: 2024-06-23 | End: 2024-06-24

## 2024-06-23 RX ORDER — ONDANSETRON 2 MG/ML
4 INJECTION INTRAMUSCULAR; INTRAVENOUS EVERY 6 HOURS PRN
Status: DISCONTINUED | OUTPATIENT
Start: 2024-06-23 | End: 2024-06-25 | Stop reason: HOSPADM

## 2024-06-23 RX ORDER — POTASSIUM CHLORIDE 7.45 MG/ML
10 INJECTION INTRAVENOUS PRN
Status: DISCONTINUED | OUTPATIENT
Start: 2024-06-23 | End: 2024-06-24

## 2024-06-23 RX ORDER — PROMETHAZINE HYDROCHLORIDE 25 MG/ML
25 INJECTION, SOLUTION INTRAMUSCULAR; INTRAVENOUS ONCE
Status: COMPLETED | OUTPATIENT
Start: 2024-06-23 | End: 2024-06-23

## 2024-06-23 RX ORDER — ONDANSETRON 2 MG/ML
4 INJECTION INTRAMUSCULAR; INTRAVENOUS ONCE
Status: DISCONTINUED | OUTPATIENT
Start: 2024-06-23 | End: 2024-06-23

## 2024-06-23 RX ADMIN — SODIUM CHLORIDE 1000 ML: 9 INJECTION, SOLUTION INTRAVENOUS at 14:12

## 2024-06-23 RX ADMIN — PROCHLORPERAZINE EDISYLATE 10 MG: 5 INJECTION INTRAMUSCULAR; INTRAVENOUS at 21:09

## 2024-06-23 RX ADMIN — SODIUM CHLORIDE 1000 ML: 9 INJECTION, SOLUTION INTRAVENOUS at 20:12

## 2024-06-23 RX ADMIN — POTASSIUM CHLORIDE 10 MEQ: 7.46 INJECTION, SOLUTION INTRAVENOUS at 23:39

## 2024-06-23 RX ADMIN — SODIUM PHOSPHATE, MONOBASIC, MONOHYDRATE AND SODIUM PHOSPHATE, DIBASIC, ANHYDROUS 15 MMOL: 142; 276 INJECTION, SOLUTION INTRAVENOUS at 20:19

## 2024-06-23 RX ADMIN — POTASSIUM CHLORIDE 10 MEQ: 7.46 INJECTION, SOLUTION INTRAVENOUS at 19:19

## 2024-06-23 RX ADMIN — SODIUM CHLORIDE: 9 INJECTION, SOLUTION INTRAVENOUS at 16:34

## 2024-06-23 RX ADMIN — FAMOTIDINE 20 MG: 10 INJECTION, SOLUTION INTRAVENOUS at 14:15

## 2024-06-23 RX ADMIN — DEXTROSE AND SODIUM CHLORIDE: 5; 450 INJECTION, SOLUTION INTRAVENOUS at 19:22

## 2024-06-23 RX ADMIN — ONDANSETRON 4 MG: 2 INJECTION INTRAMUSCULAR; INTRAVENOUS at 19:25

## 2024-06-23 RX ADMIN — SODIUM CHLORIDE 5.7 UNITS/HR: 9 INJECTION, SOLUTION INTRAVENOUS at 15:20

## 2024-06-23 RX ADMIN — SODIUM CHLORIDE 1000 ML: 9 INJECTION, SOLUTION INTRAVENOUS at 15:16

## 2024-06-23 RX ADMIN — SODIUM CHLORIDE 1000 ML: 9 INJECTION, SOLUTION INTRAVENOUS at 14:13

## 2024-06-23 RX ADMIN — POTASSIUM CHLORIDE 10 MEQ: 7.46 INJECTION, SOLUTION INTRAVENOUS at 21:13

## 2024-06-23 RX ADMIN — POTASSIUM CHLORIDE 10 MEQ: 7.46 INJECTION, SOLUTION INTRAVENOUS at 20:16

## 2024-06-23 RX ADMIN — PROMETHAZINE HYDROCHLORIDE 25 MG: 25 INJECTION INTRAMUSCULAR; INTRAVENOUS at 14:14

## 2024-06-23 ASSESSMENT — LIFESTYLE VARIABLES
HOW MANY STANDARD DRINKS CONTAINING ALCOHOL DO YOU HAVE ON A TYPICAL DAY: PATIENT DOES NOT DRINK
HOW OFTEN DO YOU HAVE A DRINK CONTAINING ALCOHOL: NEVER

## 2024-06-23 ASSESSMENT — PAIN SCALES - GENERAL
PAINLEVEL_OUTOF10: 0

## 2024-06-23 NOTE — CONSULTS
Critical Care Admit/Consult Note         Patient - Tiffanie Corral   MRN -  [id number]   [facility] # - [id number]   - 1985      Date of Admission -  2024  1:43 PM  Date of evaluation -  2024  021/021-A   Hospital Day - 0            ADMIT/CONSULT DETAILS     Reason for Admit/Consult   DKA    Consulting Service/Physician   Consulting - Laura Addison MD  Primary Care Physician - Dewayne Benavidez MD         HPI   The patient is a 38 y.o. female with significant past medical history of ADD, anxiety, depression, diabetes, and headache.  Patient presented to emergency departments afternoon for evaluation of nausea, vomiting and hyperglycemia.  Patient is type I diabetic that uses Omnipod.  Patient tells me that she went camping yesterday could smell insulin and noticed her OmniPod was off of her arm.  She proceeded to try and drink more water.  Throughout the day started having nausea and bilious vomiting.  This proceeded throughout the evening woke up this morning fatigue and felt like she was in DKA.  Initial labs , anion gap 31, CO2 11, and beta hydroxybutyrate > 10.1.  She was initiated on insulin drip and transferred to ICU for further monitoring.  Patient seen and examined here in ICU complaining of some nausea no more vomiting.  No abdominal pain.  Also has slight headache.  Right lower extremity examined psych mildly irritation/rash.  Check CRP and procalcitonin continue insulin drip and given another liter of fluid now.       Past Medical History         Diagnosis Date    ADD (attention deficit disorder with hyperactivity)     Anxiety     Depression     Diabetes mellitus (HCC)     Headache         Past Surgical History           Procedure Laterality Date    CHOLECYSTECTOMY      ENDOMETRIAL ABLATION      HYSTERECTOMY      TUBAL LIGATION         Current Medications   Current Medications    ondansetron  4 mg IntraVENous Once     dextrose bolus **OR** dextrose bolus, potassium chloride,

## 2024-06-23 NOTE — PROGRESS NOTES
..4 Eyes Skin Assessment     NAME:  Tiffanie Corral  YOB: 1985  MEDICAL RECORD NUMBER:  [id number]    The patient is being assessed for  Admission    I agree that at least one RN has performed a thorough Head to Toe Skin Assessment on the patient. ALL assessment sites listed below have been assessed.      Areas assessed by both nurses:    Head, Face, Ears, Shoulders, Back, Chest, Arms, Elbows, Hands, Sacrum. Buttock, Coccyx, Ischium, and Legs. Feet and Heels   Rash on right leg  Does the Patient have a Wound? No noted wound(s)       Anuel Prevention initiated by RN: Yes  Wound Care Orders initiated by RN: No    Pressure Injury (Stage 3,4, Unstageable, DTI, NWPT, and Complex wounds) if present, place Wound referral order by RN under : No    New Ostomies, if present place, Ostomy referral order under : No     Nurse 1 eSignature: Electronically signed by Ronna Weller RN on 6/23/24 at 6:59 PM EDT    **SHARE this note so that the co-signing nurse can place an eSignature**    Nurse 2 eSignature: Electronically signed by Rafaela Dickens RN on 6/25/24 at 10:19 AM EDT

## 2024-06-23 NOTE — ED PROVIDER NOTES
Name: Tiffanie Corral    MRN: [id number]     Date / Time Roomed:  6/23/2024  1:43 PM  ED Bed Assignment:  0211/0211-A    ------------------ History of Present Illness --------------------  6/23/24, Time: 1:44 PM EDT   Chief Complaint   Patient presents with    Nausea    Emesis     Worse in the last 3 hours    Hyperglycemia     408 per EMS. Omnipod fell oft, pt noticed this morning. BS was 400+ since last night      HPI    Tiffanie Corral is a 38 y.o. female, with hx of ADD, diabetes, depression, anxiety, headaches, cholecystectomy, hysterectomy, tubal ligation, prior DKA, who presents to the ED today for nausea vomiting over the past day.  Patient was brought in by EMS, per EMS, patient has had nausea vomiting over the past day or so.  She had related to EMS that she felt like she may be in DKA.  Per EMS, BGL was 407.  They did start IV fluids and route.  Patient states she did go on a camping trip yesterday with her daughter and  and states that her symptoms started shortly after eating some hamburger which she believes may had not been cooked all the way through.  Pt also related her glucose monitor had malfunction while on trip. Patient relates she had continued nausea and vomiting throughout the evening and this morning.  Patient also relates she had a little bit of a rash on her right shin which does not hurt or itch.  She states she put some lotion on yesterday then the rash popped up shortly afterwards.  She denies any diarrhea or constipation.  Denies any blood in the vomit or stool.  Denies any abdominal discomfort other than when she is throwing up.  She denies any chest pain or shortness of breath.  Denies any fever.  She states she has not been able to keep anything down this morning.  The pt denies other ROS at this time.     PCP: Dewayne Benavidez MD.    -------------------- PMH --------------------    Past Medical History:   has a past medical history of ADD (attention deficit disorder with

## 2024-06-24 LAB
ANION GAP SERPL CALCULATED.3IONS-SCNC: 11 MMOL/L (ref 7–16)
ANION GAP SERPL CALCULATED.3IONS-SCNC: 9 MMOL/L (ref 7–16)
BASOPHILS # BLD: 0.09 K/UL (ref 0–0.2)
BASOPHILS NFR BLD: 1 % (ref 0–2)
BUN SERPL-MCNC: 22 MG/DL (ref 6–20)
BUN SERPL-MCNC: 24 MG/DL (ref 6–20)
CALCIUM SERPL-MCNC: 7.1 MG/DL (ref 8.6–10.2)
CALCIUM SERPL-MCNC: 7.3 MG/DL (ref 8.6–10.2)
CHLORIDE SERPL-SCNC: 114 MMOL/L (ref 98–107)
CHLORIDE SERPL-SCNC: 115 MMOL/L (ref 98–107)
CO2 SERPL-SCNC: 16 MMOL/L (ref 22–29)
CO2 SERPL-SCNC: 17 MMOL/L (ref 22–29)
CREAT SERPL-MCNC: 0.8 MG/DL (ref 0.5–1)
CREAT SERPL-MCNC: 0.8 MG/DL (ref 0.5–1)
EKG ATRIAL RATE: 129 BPM
EKG P AXIS: 79 DEGREES
EKG P-R INTERVAL: 128 MS
EKG Q-T INTERVAL: 330 MS
EKG QRS DURATION: 82 MS
EKG QTC CALCULATION (BAZETT): 483 MS
EKG R AXIS: 91 DEGREES
EKG T AXIS: 60 DEGREES
EKG VENTRICULAR RATE: 129 BPM
EOSINOPHIL # BLD: 0.04 K/UL (ref 0.05–0.5)
EOSINOPHILS RELATIVE PERCENT: 0 % (ref 0–6)
ERYTHROCYTE [DISTWIDTH] IN BLOOD BY AUTOMATED COUNT: 12.8 % (ref 11.5–15)
GFR, ESTIMATED: >90 ML/MIN/1.73M2
GFR, ESTIMATED: >90 ML/MIN/1.73M2
GLUCOSE BLD-MCNC: 137 MG/DL (ref 74–99)
GLUCOSE BLD-MCNC: 138 MG/DL (ref 74–99)
GLUCOSE BLD-MCNC: 142 MG/DL (ref 74–99)
GLUCOSE BLD-MCNC: 146 MG/DL (ref 74–99)
GLUCOSE BLD-MCNC: 154 MG/DL (ref 74–99)
GLUCOSE BLD-MCNC: 164 MG/DL (ref 74–99)
GLUCOSE BLD-MCNC: 170 MG/DL (ref 74–99)
GLUCOSE BLD-MCNC: 186 MG/DL (ref 74–99)
GLUCOSE BLD-MCNC: 188 MG/DL (ref 74–99)
GLUCOSE BLD-MCNC: 206 MG/DL (ref 74–99)
GLUCOSE SERPL-MCNC: 152 MG/DL (ref 74–99)
GLUCOSE SERPL-MCNC: 156 MG/DL (ref 74–99)
HCT VFR BLD AUTO: 31.9 % (ref 34–48)
HGB BLD-MCNC: 10.3 G/DL (ref 11.5–15.5)
IMM GRANULOCYTES # BLD AUTO: 0.07 K/UL (ref 0–0.58)
IMM GRANULOCYTES NFR BLD: 0 % (ref 0–5)
LACTATE BLDV-SCNC: 0.5 MMOL/L (ref 0.5–2.2)
LYMPHOCYTES NFR BLD: 1.9 K/UL (ref 1.5–4)
LYMPHOCYTES RELATIVE PERCENT: 12 % (ref 20–42)
MAGNESIUM SERPL-MCNC: 1.7 MG/DL (ref 1.6–2.6)
MAGNESIUM SERPL-MCNC: 1.8 MG/DL (ref 1.6–2.6)
MCH RBC QN AUTO: 28.8 PG (ref 26–35)
MCHC RBC AUTO-ENTMCNC: 32.3 G/DL (ref 32–34.5)
MCV RBC AUTO: 89.1 FL (ref 80–99.9)
MONOCYTES NFR BLD: 0.97 K/UL (ref 0.1–0.95)
MONOCYTES NFR BLD: 6 % (ref 2–12)
NEUTROPHILS NFR BLD: 81 % (ref 43–80)
NEUTS SEG NFR BLD: 12.71 K/UL (ref 1.8–7.3)
PHOSPHATE SERPL-MCNC: 2.2 MG/DL (ref 2.5–4.5)
PHOSPHATE SERPL-MCNC: 2.9 MG/DL (ref 2.5–4.5)
PLATELET # BLD AUTO: 214 K/UL (ref 130–450)
PMV BLD AUTO: 8.8 FL (ref 7–12)
POTASSIUM SERPL-SCNC: 3.8 MMOL/L (ref 3.5–5)
POTASSIUM SERPL-SCNC: 4 MMOL/L (ref 3.5–5)
RBC # BLD AUTO: 3.58 M/UL (ref 3.5–5.5)
SODIUM SERPL-SCNC: 141 MMOL/L (ref 132–146)
SODIUM SERPL-SCNC: 141 MMOL/L (ref 132–146)
WBC OTHER # BLD: 15.8 K/UL (ref 4.5–11.5)

## 2024-06-24 PROCEDURE — 85025 COMPLETE CBC W/AUTO DIFF WBC: CPT

## 2024-06-24 PROCEDURE — 6370000000 HC RX 637 (ALT 250 FOR IP)

## 2024-06-24 PROCEDURE — 1200000000 HC SEMI PRIVATE

## 2024-06-24 PROCEDURE — 93010 ELECTROCARDIOGRAM REPORT: CPT | Performed by: INTERNAL MEDICINE

## 2024-06-24 PROCEDURE — 83735 ASSAY OF MAGNESIUM: CPT

## 2024-06-24 PROCEDURE — 6370000000 HC RX 637 (ALT 250 FOR IP): Performed by: INTERNAL MEDICINE

## 2024-06-24 PROCEDURE — 2500000003 HC RX 250 WO HCPCS: Performed by: STUDENT IN AN ORGANIZED HEALTH CARE EDUCATION/TRAINING PROGRAM

## 2024-06-24 PROCEDURE — 2580000003 HC RX 258: Performed by: STUDENT IN AN ORGANIZED HEALTH CARE EDUCATION/TRAINING PROGRAM

## 2024-06-24 PROCEDURE — 80048 BASIC METABOLIC PNL TOTAL CA: CPT

## 2024-06-24 PROCEDURE — 82962 GLUCOSE BLOOD TEST: CPT

## 2024-06-24 PROCEDURE — 84100 ASSAY OF PHOSPHORUS: CPT

## 2024-06-24 PROCEDURE — 83605 ASSAY OF LACTIC ACID: CPT

## 2024-06-24 PROCEDURE — 6360000002 HC RX W HCPCS: Performed by: STUDENT IN AN ORGANIZED HEALTH CARE EDUCATION/TRAINING PROGRAM

## 2024-06-24 RX ORDER — INSULIN GLARGINE 100 [IU]/ML
13 INJECTION, SOLUTION SUBCUTANEOUS DAILY
Status: DISCONTINUED | OUTPATIENT
Start: 2024-06-25 | End: 2024-06-25 | Stop reason: HOSPADM

## 2024-06-24 RX ORDER — INSULIN GLARGINE 100 [IU]/ML
13 INJECTION, SOLUTION SUBCUTANEOUS ONCE
Status: COMPLETED | OUTPATIENT
Start: 2024-06-24 | End: 2024-06-24

## 2024-06-24 RX ORDER — IBUPROFEN 400 MG/1
400 TABLET ORAL EVERY 6 HOURS PRN
Status: DISCONTINUED | OUTPATIENT
Start: 2024-06-24 | End: 2024-06-25 | Stop reason: HOSPADM

## 2024-06-24 RX ORDER — GLUCAGON 1 MG/ML
1 KIT INJECTION PRN
Status: DISCONTINUED | OUTPATIENT
Start: 2024-06-24 | End: 2024-06-25 | Stop reason: HOSPADM

## 2024-06-24 RX ORDER — INSULIN LISPRO 100 [IU]/ML
0-4 INJECTION, SOLUTION INTRAVENOUS; SUBCUTANEOUS NIGHTLY
Status: DISCONTINUED | OUTPATIENT
Start: 2024-06-24 | End: 2024-06-25 | Stop reason: HOSPADM

## 2024-06-24 RX ORDER — SODIUM BICARBONATE 650 MG/1
650 TABLET ORAL 3 TIMES DAILY
Status: COMPLETED | OUTPATIENT
Start: 2024-06-24 | End: 2024-06-24

## 2024-06-24 RX ORDER — DEXTROSE MONOHYDRATE 100 MG/ML
INJECTION, SOLUTION INTRAVENOUS CONTINUOUS PRN
Status: DISCONTINUED | OUTPATIENT
Start: 2024-06-24 | End: 2024-06-25 | Stop reason: HOSPADM

## 2024-06-24 RX ORDER — INSULIN LISPRO 100 [IU]/ML
0-4 INJECTION, SOLUTION INTRAVENOUS; SUBCUTANEOUS NIGHTLY
Status: DISCONTINUED | OUTPATIENT
Start: 2024-06-24 | End: 2024-06-24 | Stop reason: SDUPTHER

## 2024-06-24 RX ORDER — INSULIN LISPRO 100 [IU]/ML
0-16 INJECTION, SOLUTION INTRAVENOUS; SUBCUTANEOUS
Status: DISCONTINUED | OUTPATIENT
Start: 2024-06-24 | End: 2024-06-25 | Stop reason: HOSPADM

## 2024-06-24 RX ADMIN — POTASSIUM CHLORIDE 10 MEQ: 7.46 INJECTION, SOLUTION INTRAVENOUS at 04:20

## 2024-06-24 RX ADMIN — POTASSIUM CHLORIDE 10 MEQ: 7.46 INJECTION, SOLUTION INTRAVENOUS at 01:46

## 2024-06-24 RX ADMIN — POTASSIUM CHLORIDE 10 MEQ: 7.46 INJECTION, SOLUTION INTRAVENOUS at 00:45

## 2024-06-24 RX ADMIN — POTASSIUM CHLORIDE 10 MEQ: 7.46 INJECTION, SOLUTION INTRAVENOUS at 05:31

## 2024-06-24 RX ADMIN — SODIUM BICARBONATE 650 MG: 650 TABLET ORAL at 21:19

## 2024-06-24 RX ADMIN — PAROXETINE 30 MG: 10 TABLET, FILM COATED ORAL at 21:19

## 2024-06-24 RX ADMIN — INSULIN GLARGINE 13 UNITS: 100 INJECTION, SOLUTION SUBCUTANEOUS at 07:59

## 2024-06-24 RX ADMIN — SODIUM PHOSPHATE, MONOBASIC, MONOHYDRATE AND SODIUM PHOSPHATE, DIBASIC, ANHYDROUS 15 MMOL: 142; 276 INJECTION, SOLUTION INTRAVENOUS at 04:19

## 2024-06-24 RX ADMIN — PHENOL 1 SPRAY: 1.5 LIQUID ORAL at 17:26

## 2024-06-24 RX ADMIN — IBUPROFEN 400 MG: 400 TABLET, FILM COATED ORAL at 22:36

## 2024-06-24 RX ADMIN — SODIUM BICARBONATE 650 MG: 650 TABLET ORAL at 11:45

## 2024-06-24 RX ADMIN — POTASSIUM CHLORIDE 10 MEQ: 7.46 INJECTION, SOLUTION INTRAVENOUS at 06:36

## 2024-06-24 RX ADMIN — POTASSIUM CHLORIDE 10 MEQ: 7.46 INJECTION, SOLUTION INTRAVENOUS at 07:53

## 2024-06-24 RX ADMIN — SODIUM BICARBONATE 650 MG: 650 TABLET ORAL at 17:25

## 2024-06-24 ASSESSMENT — PAIN SCALES - GENERAL: PAINLEVEL_OUTOF10: 0

## 2024-06-24 NOTE — H&P
rash, induration, lesions, or ulcers  Psych: Calm and cooperative.  Normal judgement and insight.  Normal mood and affect.  Neuro: Alert and interactive, face symmetric, speech fluent.    LABS:  All labs reviewed.  Of note:  CBC with Differential:    Lab Results   Component Value Date/Time    WBC 15.8 06/24/2024 05:08 AM    RBC 3.58 06/24/2024 05:08 AM    HGB 10.3 06/24/2024 05:08 AM    HCT 31.9 06/24/2024 05:08 AM     06/24/2024 05:08 AM    MCV 89.1 06/24/2024 05:08 AM    MCH 28.8 06/24/2024 05:08 AM    MCHC 32.3 06/24/2024 05:08 AM    RDW 12.8 06/24/2024 05:08 AM    BANDSPCT 6 06/14/2015 11:47 PM    METASPCT 1 06/23/2024 01:56 PM    METASPCT 3 06/14/2015 11:47 PM    LYMPHOPCT 12 06/24/2024 05:08 AM    MONOPCT 6 06/24/2024 05:08 AM    EOSPCT 0 06/24/2024 05:08 AM    BASOPCT 1 06/24/2024 05:08 AM    MONOSABS 0.97 06/24/2024 05:08 AM    LYMPHSABS 1.81 12/24/2013 06:19 AM    EOSABS 0.04 06/24/2024 05:08 AM    BASOSABS 0.09 06/24/2024 05:08 AM     CMP:    Lab Results   Component Value Date/Time     06/24/2024 05:08 AM    K 3.8 06/24/2024 05:08 AM     06/24/2024 05:08 AM    CO2 16 06/24/2024 05:08 AM    BUN 22 06/24/2024 05:08 AM    CREATININE 0.8 06/24/2024 05:08 AM    GFRAA >60 07/12/2020 06:41 AM    LABGLOM >90 06/24/2024 05:08 AM    GLUCOSE 152 06/24/2024 05:08 AM    GLUCOSE 225 12/04/2011 04:06 PM    CALCIUM 7.1 06/24/2024 05:08 AM    BILITOT 0.6 06/23/2024 01:56 PM    ALKPHOS 100 06/23/2024 01:56 PM    AST 20 06/23/2024 01:56 PM    ALT 13 06/23/2024 01:56 PM       Imaging:  CXR: WNL    EKG:  I've personally reviewed the patient's EKG:  ST    Telemetry:  I've personally reviewed the patient's telemetry:      ASSESSMENT/PLAN:  Principal Problem:    Diabetic ketoacidosis without coma associated with type 1 diabetes mellitus (HCC)  Resolved Problems:    * No resolved hospital problems. *    38 year old female with a history of Type 1 diabetes presents to the ED with complaints of elevated glucose

## 2024-06-24 NOTE — PATIENT CARE CONFERENCE
Intensive Care Daily Quality Rounding Checklist      ICU Team Members:     ICU Day #: NUMBER: 2    Intubation Date: NA    Ventilator Day #: NA    Central Line Insertion Date: NA        Day #: NA       Indication: NA     Arterial Line Insertion Date: NA      Day #: NA    Temporary Hemodialysis Catheter Insertion Date: NA      Day # : NA    DVT Prophylaxis:    GI Prophylaxis:    Ferrari Catheter Insertion Date: NA       Day #: NA      Indications: NA      Continued need (if yes, reason documented and discussed with physician):     Skin Issues/ Wounds and ordered treatment discussed on rounds: Rash RLE, generalized sunburn    Goals/ Plans for the Day: Bridge from Insulin drip to SQ, monitor and treat labs, monitor vitals, transfer to [facility]    Reviewed plan and goals for day with patient and/or representative:

## 2024-06-24 NOTE — FLOWSHEET NOTE
4 Eyes Skin Assessment     NAME:  Tiffanie Corral  YOB: 1985  MEDICAL RECORD NUMBER:  [id number]    The patient is being assessed for  Admission    I agree that at least one RN has performed a thorough Head to Toe Skin Assessment on the patient. ALL assessment sites listed below have been assessed.      Areas assessed by both nurses:    Head, Face, Ears, Shoulders, Back, Chest, Arms, Elbows, Hands, Sacrum. Buttock, Coccyx, Ischium, Legs. Feet and Heels, Under Medical Devices , and Other rash to right leg        Does the Patient have a Wound? No noted wound(s)       Anuel Prevention initiated by RN: Yes  Wound Care Orders initiated by RN: Yes    Pressure Injury (Stage 3,4, Unstageable, DTI, NWPT, and Complex wounds) if present, place Wound referral order by RN under : No    New Ostomies, if present place, Ostomy referral order under : No     Nurse 1 eSignature: Electronically signed by Gracy Lowery on 6/24/24 at 1:20 PM EDT    **SHARE this note so that the co-signing nurse can place an eSignature**    Nurse 2 eSignature: Electronically signed by Lynnette Tripathi RN on 6/24/24 at 1:21 PM EDT

## 2024-06-24 NOTE — CARE COORDINATION
CASE MANAGEMENT.... Patient in icu. Admitted with DKA. Met with her at the bedside. She voices being independent from home with her  and daughter. She has glucometer and all necessary diabetic supplies. Wears an omnipod. Follows with Endocrine in [place]. PCP is Dr Benavidez. Iv insulin gtt discontinued and subq insulin initiated. Stable for transfer to [facility]. Continue to monitor labs. Anticipate dc soon. Discharge plan is home. Will follow for needs. None noted at this time.

## 2024-06-24 NOTE — PLAN OF CARE
Problem: Pain  Goal: Verbalizes/displays adequate comfort level or baseline comfort level  Outcome: Progressing  Flowsheets  Taken 6/23/2024 2300  Verbalizes/displays adequate comfort level or baseline comfort level: Assess pain using appropriate pain scale  Taken 6/23/2024 2200  Verbalizes/displays adequate comfort level or baseline comfort level: Assess pain using appropriate pain scale  Taken 6/23/2024 2100  Verbalizes/displays adequate comfort level or baseline comfort level: Assess pain using appropriate pain scale  Taken 6/23/2024 2000  Verbalizes/displays adequate comfort level or baseline comfort level: Assess pain using appropriate pain scale     Problem: Safety - Adult  Goal: Free from fall injury  Outcome: Progressing     Problem: Discharge Planning  Goal: Discharge to home or other facility with appropriate resources  Recent Flowsheet Documentation  Taken 6/23/2024 2000 by Edie Pal RN  Discharge to home or other facility with appropriate resources:   Identify barriers to discharge with patient and caregiver   Arrange for needed discharge resources and transportation as appropriate      5

## 2024-06-24 NOTE — PROGRESS NOTES
discussed with patient her health, Hinduism/spirituality, and explored if she had any needs for support at this time. Patient seems to be coping well and expects to be downgraded or discharged soon. Chaplains will remain available for support.

## 2024-06-24 NOTE — PROGRESS NOTES
Critical Care Team - Daily Progress Note      Date and time: 6/24/2024 9:59 AM  Patient's name:  Tiffanie Corral  Medical Record Number: [id number]  Patient's account/billing number: [id number]  Patient's YOB: 1985  Age: 38 y.o.  Date of Admission: 6/23/2024  1:43 PM  Length of stay during current admission: 1      Primary Care Physician: Dewayne Benavidez MD  ICU Attending Physician: Dr. Givens    Code Status: Full Code    Reason for ICU admission: DKA      SUBJECTIVE:     OVERNIGHT EVENTS:       No acute events overnight, orders for bridging are in place.  Patient's anion gap is closed x 3.        Intake/Output:   No intake/output data recorded.  I/O last 3 completed shifts:  In: 4427 [I.V.:216; IV Piggyback:4211.1]  Out: 150 [Urine:150]    Awake and following commands: Yes  Current Ventilation: - No ventilator support  Secretions: Minimal   Sedation: none  Paralyzed: No  Vasopressors: None    ROS:  Unless stated above, ROS is otherwise negative.      Initial HPI + past overnight events:   The patient is a 38 y.o. female with significant past medical history of ADD, anxiety, depression, diabetes, and headache.  Patient presented to emergency departments afternoon for evaluation of nausea, vomiting and hyperglycemia.  Patient is type I diabetic that uses Omnipod.  Patient tells me that she went camping yesterday could smell insulin and noticed her OmniPod was off of her arm.  She proceeded to try and drink more water.  Throughout the day started having nausea and bilious vomiting.  This proceeded throughout the evening woke up this morning fatigue and felt like she was in DKA.  Initial labs , anion gap 31, CO2 11, and beta hydroxybutyrate > 10.1.  She was initiated on insulin drip and transferred to ICU for further monitoring.  Patient seen and examined here in ICU complaining of some nausea no more vomiting.  No abdominal pain.  Also has slight headache.  Right lower extremity examined

## 2024-06-24 NOTE — PROGRESS NOTES
SPIRITUAL HEALTH SERVICES - [facility] Encounter    Name: Tiffanie Corral                  Referral: Routine Visit    Sacraments  Anointed (Last Rites): No  Apostolic [place]: No  Confession: No  Communion: No     Assessment:  Patient unavailable for visit.      Intervention:  None.     Outcome:  None.    Plan:  Chaplains will remain available to offer spiritual and emotional support as needed.      Electronically signed by Chaplain Dilshad, on 6/24/2024 at 4:00 PM.  Spiritual Care Department  [facility]  [phone]

## 2024-06-25 VITALS
HEIGHT: 69 IN | RESPIRATION RATE: 15 BRPM | SYSTOLIC BLOOD PRESSURE: 113 MMHG | HEART RATE: 85 BPM | TEMPERATURE: 98.1 F | OXYGEN SATURATION: 98 % | WEIGHT: 146.16 LBS | DIASTOLIC BLOOD PRESSURE: 73 MMHG | BODY MASS INDEX: 21.65 KG/M2

## 2024-06-25 LAB
ANION GAP SERPL CALCULATED.3IONS-SCNC: 10 MMOL/L (ref 7–16)
BASOPHILS # BLD: 0.07 K/UL (ref 0–0.2)
BASOPHILS NFR BLD: 1 % (ref 0–2)
BUN SERPL-MCNC: 13 MG/DL (ref 6–20)
CALCIUM SERPL-MCNC: 7.8 MG/DL (ref 8.6–10.2)
CHLORIDE SERPL-SCNC: 109 MMOL/L (ref 98–107)
CO2 SERPL-SCNC: 20 MMOL/L (ref 22–29)
CREAT SERPL-MCNC: 0.6 MG/DL (ref 0.5–1)
EOSINOPHIL # BLD: 0.14 K/UL (ref 0.05–0.5)
EOSINOPHILS RELATIVE PERCENT: 2 % (ref 0–6)
ERYTHROCYTE [DISTWIDTH] IN BLOOD BY AUTOMATED COUNT: 12.6 % (ref 11.5–15)
GFR, ESTIMATED: >90 ML/MIN/1.73M2
GLUCOSE BLD-MCNC: 115 MG/DL (ref 74–99)
GLUCOSE SERPL-MCNC: 117 MG/DL (ref 74–99)
HCT VFR BLD AUTO: 32.6 % (ref 34–48)
HGB BLD-MCNC: 10.6 G/DL (ref 11.5–15.5)
IMM GRANULOCYTES # BLD AUTO: 0.04 K/UL (ref 0–0.58)
IMM GRANULOCYTES NFR BLD: 1 % (ref 0–5)
LYMPHOCYTES NFR BLD: 2.21 K/UL (ref 1.5–4)
LYMPHOCYTES RELATIVE PERCENT: 27 % (ref 20–42)
MAGNESIUM SERPL-MCNC: 2.1 MG/DL (ref 1.6–2.6)
MCH RBC QN AUTO: 28.5 PG (ref 26–35)
MCHC RBC AUTO-ENTMCNC: 32.5 G/DL (ref 32–34.5)
MCV RBC AUTO: 87.6 FL (ref 80–99.9)
MICROORGANISM SPEC CULT: ABNORMAL
MICROORGANISM SPEC CULT: ABNORMAL
MICROORGANISM SPEC CULT: NORMAL
MONOCYTES NFR BLD: 0.57 K/UL (ref 0.1–0.95)
MONOCYTES NFR BLD: 7 % (ref 2–12)
NEUTROPHILS NFR BLD: 63 % (ref 43–80)
NEUTS SEG NFR BLD: 5.22 K/UL (ref 1.8–7.3)
PHOSPHATE SERPL-MCNC: 2.2 MG/DL (ref 2.5–4.5)
PLATELET # BLD AUTO: 179 K/UL (ref 130–450)
PMV BLD AUTO: 8.6 FL (ref 7–12)
POTASSIUM SERPL-SCNC: 3.7 MMOL/L (ref 3.5–5)
RBC # BLD AUTO: 3.72 M/UL (ref 3.5–5.5)
SERVICE CMNT-IMP: ABNORMAL
SERVICE CMNT-IMP: NORMAL
SODIUM SERPL-SCNC: 139 MMOL/L (ref 132–146)
SPECIMEN DESCRIPTION: ABNORMAL
SPECIMEN DESCRIPTION: NORMAL
WBC OTHER # BLD: 8.3 K/UL (ref 4.5–11.5)

## 2024-06-25 PROCEDURE — 82962 GLUCOSE BLOOD TEST: CPT

## 2024-06-25 PROCEDURE — 85025 COMPLETE CBC W/AUTO DIFF WBC: CPT

## 2024-06-25 PROCEDURE — 83735 ASSAY OF MAGNESIUM: CPT

## 2024-06-25 PROCEDURE — 84100 ASSAY OF PHOSPHORUS: CPT

## 2024-06-25 PROCEDURE — 80048 BASIC METABOLIC PNL TOTAL CA: CPT

## 2024-06-25 RX ORDER — INSULIN LISPRO 100 [IU]/ML
8 INJECTION, SOLUTION INTRAVENOUS; SUBCUTANEOUS
Qty: 5 ADJUSTABLE DOSE PRE-FILLED PEN SYRINGE | Refills: 3 | Status: SHIPPED | OUTPATIENT
Start: 2024-06-25

## 2024-06-25 RX ORDER — INSULIN GLARGINE 100 [IU]/ML
13 INJECTION, SOLUTION SUBCUTANEOUS NIGHTLY
Qty: 5 ADJUSTABLE DOSE PRE-FILLED PEN SYRINGE | Refills: 3 | Status: SHIPPED | OUTPATIENT
Start: 2024-06-25 | End: 2024-06-25 | Stop reason: HOSPADM

## 2024-06-25 ASSESSMENT — PAIN SCALES - GENERAL
PAINLEVEL_OUTOF10: 0
PAINLEVEL_OUTOF10: 0

## 2024-06-25 NOTE — PLAN OF CARE
Problem: Discharge Planning  Goal: Discharge to home or other facility with appropriate resources  6/25/2024 0811 by Rafaela Dickens RN  Outcome: Progressing  Flowsheets (Taken 6/25/2024 0800)  Discharge to home or other facility with appropriate resources: (will discuss potential for discharge with attending provider during rounds) Arrange for needed discharge resources and transportation as appropriate  6/25/2024 0127 by Janet Freeman RN  Outcome: Progressing  Flowsheets (Taken 6/24/2024 2000)  Discharge to home or other facility with appropriate resources: Identify barriers to discharge with patient and caregiver     Problem: Pain  Goal: Verbalizes/displays adequate comfort level or baseline comfort level  6/25/2024 0811 by Rafaela Dickens RN  Outcome: Progressing  6/25/2024 0127 by Janet Freeman RN  Outcome: Progressing     Problem: Safety - Adult  Goal: Free from fall injury  6/25/2024 0811 by Rafaela Dickens RN  Outcome: Progressing  6/25/2024 0127 by Janet Freeman RN  Outcome: Progressing     Problem: Chronic Conditions and Co-morbidities  Goal: Patient's chronic conditions and co-morbidity symptoms are monitored and maintained or improved  6/25/2024 0811 by Rafaela Dickens RN  Outcome: Progressing  Flowsheets (Taken 6/25/2024 0800)  Care Plan - Patient's Chronic Conditions and Co-Morbidity Symptoms are Monitored and Maintained or Improved:   Monitor and assess patient's chronic conditions and comorbid symptoms for stability, deterioration, or improvement   Collaborate with multidisciplinary team to address chronic and comorbid conditions and prevent exacerbation or deterioration  6/25/2024 0127 by Janet Freeman RN  Outcome: Progressing  Flowsheets (Taken 6/24/2024 2000)  Care Plan - Patient's Chronic Conditions and Co-Morbidity Symptoms are Monitored and Maintained or Improved: Monitor and assess patient's chronic conditions and comorbid symptoms for stability, deterioration, or

## 2024-06-25 NOTE — PROGRESS NOTES
Discharge instructions provided to patient. Patient verbalized understanding of all discharge instructions. Patient belongings packed, including cell phone, , toiletries, clothing, blanket, and pillow. Patient ambulates out of ICU and accompanied by RN to private vehicle at hospital entrance. Condition stable at time of discharge.

## 2024-06-25 NOTE — PROGRESS NOTES
SPIRITUAL HEALTH SERVICES - [facility] Encounter    Name: Tiffanie Corral                  Referral: Routine Visit    Sacraments  Anointed (Last Rites): No  Apostolic [place]: No  Confession: No  Communion: No     Assessment:  Patient had already been discharged.      Intervention:  None.     Outcome:  None.    Plan:  None.      Electronically signed by Chaplain Dilshad, on 6/25/2024 at 7:32 PM.  Spiritual Care Department  [facility]  [phone]

## 2024-06-25 NOTE — PATIENT CARE CONFERENCE
Intensive Care Daily Quality Rounding Checklist        ICU Team Members:      ICU Day #: NUMBER: 3     Intubation Date: NA     Ventilator Day #: NA     Central Line Insertion Date: NA                                                    Day #: NA                                        Indication: NA      Arterial Line Insertion Date: NA                             Day #: NA     Temporary Hemodialysis Catheter Insertion Date: NA                             Day # : NA     DVT Prophylaxis:    GI Prophylaxis:     Ferrari Catheter Insertion Date: NA                                        Day #: NA                             Indications: NA                             Continued need (if yes, reason documented and discussed with physician):      Skin Issues/ Wounds and ordered treatment discussed on rounds: Rash RLE, generalized sunburn     Goals/ Plans for the Day: Monitor and treat labs, monitor vitals, transfer to IM/discharge     Reviewed plan and goals for day with patient and/or representative:         Revision History

## 2024-06-25 NOTE — PLAN OF CARE
Problem: Discharge Planning  Goal: Discharge to home or other facility with appropriate resources  6/25/2024 1136 by Rafaela Dickens RN  Outcome: Completed  6/25/2024 0811 by Rafaela Dickens RN  Outcome: Progressing  Flowsheets (Taken 6/25/2024 0800)  Discharge to home or other facility with appropriate resources: (will discuss potential for discharge with attending provider during rounds) Arrange for needed discharge resources and transportation as appropriate  6/25/2024 0127 by Janet Freeman RN  Outcome: Progressing  Flowsheets (Taken 6/24/2024 2000)  Discharge to home or other facility with appropriate resources: Identify barriers to discharge with patient and caregiver     Problem: Pain  Goal: Verbalizes/displays adequate comfort level or baseline comfort level  6/25/2024 1136 by Rafaela Dickens RN  Outcome: Completed  6/25/2024 0811 by Rafaela Dickens RN  Outcome: Progressing  6/25/2024 0127 by Janet Freeman RN  Outcome: Progressing     Problem: Safety - Adult  Goal: Free from fall injury  6/25/2024 1136 by Rafaela Dickens RN  Outcome: Completed  6/25/2024 0811 by Rafaela Dickens RN  Outcome: Progressing  6/25/2024 0127 by Janet Freeman RN  Outcome: Progressing     Problem: Chronic Conditions and Co-morbidities  Goal: Patient's chronic conditions and co-morbidity symptoms are monitored and maintained or improved  6/25/2024 1136 by Rafaela Dickens RN  Outcome: Completed  6/25/2024 0811 by Rafaela Dickens RN  Outcome: Progressing  Flowsheets (Taken 6/25/2024 0800)  Care Plan - Patient's Chronic Conditions and Co-Morbidity Symptoms are Monitored and Maintained or Improved:   Monitor and assess patient's chronic conditions and comorbid symptoms for stability, deterioration, or improvement   Collaborate with multidisciplinary team to address chronic and comorbid conditions and prevent exacerbation or deterioration  6/25/2024 0127 by Janet Freeman RN  Outcome:

## 2024-06-25 NOTE — PROGRESS NOTES
Call placed to Dimple Dai NP to notify that patient is requesting to be evaluated and discharged today. Dimple to discuss case with Dr. Addison. Patient updated.

## 2024-06-25 NOTE — PROGRESS NOTES
Call placed to Dimple Dai to request clarification on discharge insulin instructions, as patient does not take lantus at home--only uses omnipod for insulin administration. Awaiting clarification to discharge instructions.

## 2024-06-25 NOTE — PLAN OF CARE
Problem: Discharge Planning  Goal: Discharge to home or other facility with appropriate resources  Outcome: Progressing  Flowsheets (Taken 6/24/2024 2000)  Discharge to home or other facility with appropriate resources: Identify barriers to discharge with patient and caregiver     Problem: Pain  Goal: Verbalizes/displays adequate comfort level or baseline comfort level  Outcome: Progressing     Problem: Safety - Adult  Goal: Free from fall injury  Outcome: Progressing     Problem: Chronic Conditions and Co-morbidities  Goal: Patient's chronic conditions and co-morbidity symptoms are monitored and maintained or improved  Outcome: Progressing  Flowsheets (Taken 6/24/2024 2000)  Care Plan - Patient's Chronic Conditions and Co-Morbidity Symptoms are Monitored and Maintained or Improved: Monitor and assess patient's chronic conditions and comorbid symptoms for stability, deterioration, or improvement

## 2024-06-25 NOTE — PROGRESS NOTES
..Patient admitted from ER to 211, with the following belongings; clothes, cell phone and , placed on monitor, patient oriented to room and unit visiting hours.  Patient guide at bedside, reviewed patient rights and responsibilities. MRSA nasal swab obtained.  Bed alarm on.  Call light within reach.

## 2025-01-21 ENCOUNTER — OFFICE VISIT (OUTPATIENT)
Dept: FAMILY MEDICINE CLINIC | Age: 40
End: 2025-01-21
Payer: COMMERCIAL

## 2025-01-21 ENCOUNTER — HOSPITAL ENCOUNTER (OUTPATIENT)
Dept: CT IMAGING | Age: 40
Discharge: HOME | End: 2025-01-23
Payer: COMMERCIAL

## 2025-01-21 VITALS
DIASTOLIC BLOOD PRESSURE: 80 MMHG | BODY MASS INDEX: 22.15 KG/M2 | SYSTOLIC BLOOD PRESSURE: 120 MMHG | RESPIRATION RATE: 16 BRPM | OXYGEN SATURATION: 98 % | TEMPERATURE: 97.5 F | WEIGHT: 150 LBS | HEART RATE: 83 BPM

## 2025-01-21 DIAGNOSIS — N39.0 URINARY TRACT INFECTION WITHOUT HEMATURIA, SITE UNSPECIFIED: Primary | ICD-10-CM

## 2025-01-21 DIAGNOSIS — R10.9 RIGHT FLANK PAIN: ICD-10-CM

## 2025-01-21 DIAGNOSIS — R73.9 HYPERGLYCEMIA, UNSPECIFIED: ICD-10-CM

## 2025-01-21 LAB
ALBUMIN: 4.2 G/DL (ref 3.5–5.2)
ALP BLD-CCNC: 95 U/L (ref 35–104)
ALT SERPL-CCNC: 10 U/L (ref 0–32)
ANION GAP SERPL CALCULATED.3IONS-SCNC: 11 MMOL/L (ref 7–16)
AST SERPL-CCNC: 15 U/L (ref 0–31)
BASOPHILS ABSOLUTE: 0.08 K/UL (ref 0–0.2)
BASOPHILS RELATIVE PERCENT: 1 % (ref 0–2)
BILIRUB SERPL-MCNC: 0.3 MG/DL (ref 0–1.2)
BILIRUBIN, POC: NORMAL
BLOOD URINE, POC: NORMAL
BUN BLDV-MCNC: 20 MG/DL (ref 6–20)
CALCIUM SERPL-MCNC: 8.8 MG/DL (ref 8.6–10.2)
CHLORIDE BLD-SCNC: 98 MMOL/L (ref 98–107)
CLARITY, POC: CLEAR
CO2: 24 MMOL/L (ref 22–29)
COLOR, POC: YELLOW
CREAT SERPL-MCNC: 0.8 MG/DL (ref 0.5–1)
EOSINOPHILS ABSOLUTE: 0.12 K/UL (ref 0.05–0.5)
EOSINOPHILS RELATIVE PERCENT: 1 % (ref 0–6)
GFR, ESTIMATED: >90 ML/MIN/1.73M2
GLUCOSE BLD-MCNC: 422 MG/DL (ref 74–99)
GLUCOSE URINE, POC: 500 MG/DL
HCT VFR BLD CALC: 39.1 % (ref 34–48)
HEMOGLOBIN: 12.9 G/DL (ref 11.5–15.5)
IMMATURE GRANULOCYTES %: 1 % (ref 0–5)
IMMATURE GRANULOCYTES ABSOLUTE: 0.07 K/UL (ref 0–0.58)
KETONES, POC: NORMAL MG/DL
LEUKOCYTE EST, POC: NORMAL
LIPASE: 26 U/L (ref 13–60)
LYMPHOCYTES ABSOLUTE: 1.44 K/UL (ref 1.5–4)
LYMPHOCYTES RELATIVE PERCENT: 15 % (ref 20–42)
MCH RBC QN AUTO: 28.9 PG (ref 26–35)
MCHC RBC AUTO-ENTMCNC: 33 G/DL (ref 32–34.5)
MCV RBC AUTO: 87.7 FL (ref 80–99.9)
MONOCYTES ABSOLUTE: 0.69 K/UL (ref 0.1–0.95)
MONOCYTES RELATIVE PERCENT: 7 % (ref 2–12)
NEUTROPHILS ABSOLUTE: 7.53 K/UL (ref 1.8–7.3)
NEUTROPHILS RELATIVE PERCENT: 76 % (ref 43–80)
NITRITE, POC: NORMAL
PDW BLD-RTO: 12.2 % (ref 11.5–15)
PH, POC: 7
PLATELET # BLD: 275 K/UL (ref 130–450)
PMV BLD AUTO: 9.4 FL (ref 7–12)
POTASSIUM SERPL-SCNC: 4.6 MMOL/L (ref 3.5–5)
PROTEIN, POC: NORMAL MG/DL
RBC # BLD: 4.46 M/UL (ref 3.5–5.5)
SODIUM BLD-SCNC: 133 MMOL/L (ref 132–146)
SPECIFIC GRAVITY, POC: 1.02
TOTAL PROTEIN: 6.7 G/DL (ref 6.4–8.3)
UROBILINOGEN, POC: 0.2 MG/DL
WBC # BLD: 9.9 K/UL (ref 4.5–11.5)

## 2025-01-21 PROCEDURE — G8427 DOCREV CUR MEDS BY ELIG CLIN: HCPCS | Performed by: PHYSICIAN ASSISTANT

## 2025-01-21 PROCEDURE — G8420 CALC BMI NORM PARAMETERS: HCPCS | Performed by: PHYSICIAN ASSISTANT

## 2025-01-21 PROCEDURE — 1036F TOBACCO NON-USER: CPT | Performed by: PHYSICIAN ASSISTANT

## 2025-01-21 PROCEDURE — 81002 URINALYSIS NONAUTO W/O SCOPE: CPT | Performed by: PHYSICIAN ASSISTANT

## 2025-01-21 PROCEDURE — 99204 OFFICE O/P NEW MOD 45 MIN: CPT | Performed by: PHYSICIAN ASSISTANT

## 2025-01-21 PROCEDURE — 74176 CT ABD & PELVIS W/O CONTRAST: CPT

## 2025-01-21 RX ORDER — POLYETHYLENE GLYCOL 3350 17 G/17G
17 POWDER, FOR SOLUTION ORAL DAILY
Status: DISCONTINUED | OUTPATIENT
Start: 2025-01-21 | End: 2025-01-24 | Stop reason: HOSPADM

## 2025-01-21 RX ORDER — CEFDINIR 300 MG/1
300 CAPSULE ORAL EVERY 12 HOURS SCHEDULED
Status: DISCONTINUED | OUTPATIENT
Start: 2025-01-21 | End: 2025-01-24 | Stop reason: HOSPADM

## 2025-01-21 NOTE — PROGRESS NOTES
25  Tiffanie Corral : 1985 Sex: female  Age 39 y.o.      Subjective:  Chief Complaint   Patient presents with    Flank Pain         HPI:     History of Present Illness  The patient is a 39-year-old female who presents for evaluation of right-sided back pain.    She reports experiencing severe right-sided back pain, which has been progressively worsening over the past two days. She describes the sensation as akin to being stabbed in the back. She has not engaged in any heavy lifting or twisting activities that could potentially cause such pain. She expresses concern about potential kidney damage due to her diabetic condition. She does not report any associated vomiting or fevers but mentions episodes of night sweats. She does not suspect a urinary tract infection (UTI) as she typically experiences discomfort during such episodes, which is currently absent. She has a history of kidney stones, although it has been several years since her last episode. She does not report any vaginal discharge or bleeding. Her bowel movements are regular. She has not undergone any recent renal evaluations.    Supplemental Information  She has a history of cholecystectomy and hysterectomy, with her ovaries remaining intact. She has undergone tubal ligation and has a cyst on one of her ovaries. She has a history of shingles.    FAMILY HISTORY  The patient reports a family history of pancreatic cancer.            ROS:   Unless otherwise stated in this report the patient's positive and negative responses for review of systems for constitutional, eyes, ENT, cardiovascular, respiratory, gastrointestinal, neurological, , musculoskeletal, and integument systems and related systems to the presenting problem are either stated in the history of present illness or were not pertinent or were negative for the symptoms and/or complaints related to the presenting medical problem.  Positives and pertinent negatives as per HPI.  All

## 2025-01-22 DIAGNOSIS — R10.9 RIGHT FLANK PAIN: ICD-10-CM

## 2025-01-22 RX ORDER — CEFDINIR 300 MG/1
300 CAPSULE ORAL 2 TIMES DAILY
Qty: 14 CAPSULE | Refills: 0 | Status: SHIPPED | OUTPATIENT
Start: 2025-01-22 | End: 2025-01-29

## 2025-01-22 RX ORDER — POLYETHYLENE GLYCOL 3350 17 G/17G
17 POWDER, FOR SOLUTION ORAL DAILY
Qty: 255 G | Refills: 0 | Status: SHIPPED | OUTPATIENT
Start: 2025-01-22 | End: 2025-02-06

## 2025-01-22 NOTE — RESULT ENCOUNTER NOTE
Left voice message for patient to return phone call    Her glucose is significantly elevated 422.  The remainder of her labs seem to be unremarkable.    Can you see if the patient checked her glucose this morning

## 2025-01-24 LAB
CULTURE: NORMAL
CULTURE: NORMAL
SPECIMEN DESCRIPTION: NORMAL

## 2025-03-03 ENCOUNTER — OFFICE VISIT (OUTPATIENT)
Dept: FAMILY MEDICINE CLINIC | Age: 40
End: 2025-03-03
Payer: COMMERCIAL

## 2025-03-03 VITALS
BODY MASS INDEX: 21.59 KG/M2 | SYSTOLIC BLOOD PRESSURE: 132 MMHG | WEIGHT: 146.2 LBS | DIASTOLIC BLOOD PRESSURE: 76 MMHG | TEMPERATURE: 97.7 F | OXYGEN SATURATION: 97 % | HEART RATE: 100 BPM

## 2025-03-03 DIAGNOSIS — J01.90 ACUTE NON-RECURRENT SINUSITIS, UNSPECIFIED LOCATION: Primary | ICD-10-CM

## 2025-03-03 PROCEDURE — 96372 THER/PROPH/DIAG INJ SC/IM: CPT | Performed by: PHYSICIAN ASSISTANT

## 2025-03-03 PROCEDURE — G8427 DOCREV CUR MEDS BY ELIG CLIN: HCPCS | Performed by: PHYSICIAN ASSISTANT

## 2025-03-03 PROCEDURE — 1036F TOBACCO NON-USER: CPT | Performed by: PHYSICIAN ASSISTANT

## 2025-03-03 PROCEDURE — 99214 OFFICE O/P EST MOD 30 MIN: CPT | Performed by: PHYSICIAN ASSISTANT

## 2025-03-03 PROCEDURE — G8420 CALC BMI NORM PARAMETERS: HCPCS | Performed by: PHYSICIAN ASSISTANT

## 2025-03-03 RX ORDER — LISDEXAMFETAMINE DIMESYLATE 20 MG/1
20 CAPSULE ORAL DAILY
COMMUNITY

## 2025-03-03 RX ORDER — HYDROXYZINE HYDROCHLORIDE 10 MG/5ML
4 SYRUP ORAL EVERY 6 HOURS PRN
Qty: 20 TABLET | Refills: 0 | Status: SHIPPED | OUTPATIENT
Start: 2025-03-03

## 2025-03-03 RX ORDER — KETOROLAC TROMETHAMINE 30 MG/ML
30 INJECTION, SOLUTION INTRAMUSCULAR; INTRAVENOUS ONCE
Status: COMPLETED | OUTPATIENT
Start: 2025-03-03 | End: 2025-03-03

## 2025-03-03 RX ADMIN — KETOROLAC TROMETHAMINE 30 MG: 30 INJECTION, SOLUTION INTRAMUSCULAR; INTRAVENOUS at 12:58

## 2025-03-03 NOTE — PROGRESS NOTES
3/3/25  Tiffanie Corral : 1985 Sex: female  Age 39 y.o.      Subjective:  Chief Complaint   Patient presents with    Sinusitis     Started a week and a half ago         HPI:     History of Present Illness  The patient is a 39-year-old female who presents for evaluation of sinus issues.    She has been experiencing discomfort behind her eye, particularly in the eyebrow region, which she attributes to a sinus-related issue. She reports no visual disturbances such as blurred vision or seeing spots. Initially, she experienced nasal discharge with yellow-green coloration, but this symptom has since resolved. Currently, she experiences a sensation of pressure in her eye when blowing her nose. She also reports occasional bubbling sensations. She has tried various home remedies, including cold and warm compresses, sleeping on the opposite side, sitting up, using a cool mist humidifier, and taking Sudafed, all without relief. She has an upcoming dental appointment on Friday for the extraction of two teeth with cavities under the fillings, which have been identified as potential contributors to her sinus issues. She is not interested in undergoing a CT scan at this time. She is not interested in receiving a Medrol Dosepak again or a steroid injection. She is open to trying ketorolac and inquired about the possibility of a stronger antibiotic. Despite initiating cefdinir therapy last Friday, her symptoms have progressively worsened, leading to sleep disturbances. She has previously completed a course of Medrol Dosepak for persistent congestion.    Supplemental Information  She is diabetic and follows up with endocrinologist at [facility].    MEDICATIONS  Current: Cefdinir  Past: Medrol Dosepak            ROS:   Unless otherwise stated in this report the patient's positive and negative responses for review of systems for constitutional, eyes, ENT, cardiovascular, respiratory, gastrointestinal, neurological,

## 2025-07-11 LAB
ALBUMIN SERPL-MCNC: 4.2 G/DL (ref 3.5–5.2)
ALP SERPL-CCNC: 72 U/L (ref 35–104)
ALT SERPL-CCNC: 6 U/L (ref 0–35)
ANION GAP SERPL CALCULATED.3IONS-SCNC: 13 MMOL/L (ref 7–16)
AST SERPL-CCNC: 17 U/L (ref 0–35)
BASOPHILS # BLD: 0.11 K/UL (ref 0–0.2)
BASOPHILS NFR BLD: 1 % (ref 0–2)
BILIRUB SERPL-MCNC: 0.2 MG/DL (ref 0–1.2)
BUN SERPL-MCNC: 12 MG/DL (ref 6–20)
CALCIUM SERPL-MCNC: 9.4 MG/DL (ref 8.6–10)
CHLORIDE SERPL-SCNC: 100 MMOL/L (ref 98–107)
CHOLEST SERPL-MCNC: 186 MG/DL
CO2 SERPL-SCNC: 23 MMOL/L (ref 22–29)
CREAT SERPL-MCNC: 0.8 MG/DL (ref 0.5–1)
EOSINOPHIL # BLD: 0.27 K/UL (ref 0.05–0.5)
EOSINOPHILS RELATIVE PERCENT: 3 % (ref 0–6)
ERYTHROCYTE [DISTWIDTH] IN BLOOD BY AUTOMATED COUNT: 12.3 % (ref 11.5–15)
GFR, ESTIMATED: >90 ML/MIN/1.73M2
GLUCOSE SERPL-MCNC: 185 MG/DL (ref 74–99)
HCT VFR BLD AUTO: 38.8 % (ref 34–48)
HDLC SERPL-MCNC: 63 MG/DL
HGB BLD-MCNC: 12.6 G/DL (ref 11.5–15.5)
IMM GRANULOCYTES # BLD AUTO: 0.03 K/UL (ref 0–0.58)
IMM GRANULOCYTES NFR BLD: 0 % (ref 0–5)
LDLC SERPL CALC-MCNC: 108 MG/DL
LYMPHOCYTES NFR BLD: 1.86 K/UL (ref 1.5–4)
LYMPHOCYTES RELATIVE PERCENT: 21 % (ref 20–42)
MCH RBC QN AUTO: 28.7 PG (ref 26–35)
MCHC RBC AUTO-ENTMCNC: 32.5 G/DL (ref 32–34.5)
MCV RBC AUTO: 88.4 FL (ref 80–99.9)
MONOCYTES NFR BLD: 0.58 K/UL (ref 0.1–0.95)
MONOCYTES NFR BLD: 7 % (ref 2–12)
NEUTROPHILS NFR BLD: 68 % (ref 43–80)
NEUTS SEG NFR BLD: 6.03 K/UL (ref 1.8–7.3)
PLATELET # BLD AUTO: 249 K/UL (ref 130–450)
PMV BLD AUTO: 9.4 FL (ref 7–12)
POTASSIUM SERPL-SCNC: 4.6 MMOL/L (ref 3.5–5.1)
PROT SERPL-MCNC: 7.2 G/DL (ref 6.4–8.3)
RBC # BLD AUTO: 4.39 M/UL (ref 3.5–5.5)
SODIUM SERPL-SCNC: 137 MMOL/L (ref 136–145)
TRIGL SERPL-MCNC: 76 MG/DL
TSH SERPL DL<=0.05 MIU/L-ACNC: 1.53 UIU/ML (ref 0.27–4.2)
VLDLC SERPL CALC-MCNC: 15 MG/DL
WBC OTHER # BLD: 8.9 K/UL (ref 4.5–11.5)

## 2025-07-28 ENCOUNTER — OFFICE VISIT (OUTPATIENT)
Dept: FAMILY MEDICINE CLINIC | Age: 40
End: 2025-07-28
Payer: COMMERCIAL

## 2025-07-28 VITALS
BODY MASS INDEX: 22.22 KG/M2 | OXYGEN SATURATION: 97 % | HEIGHT: 69 IN | RESPIRATION RATE: 16 BRPM | DIASTOLIC BLOOD PRESSURE: 64 MMHG | HEART RATE: 83 BPM | SYSTOLIC BLOOD PRESSURE: 100 MMHG | WEIGHT: 150 LBS | TEMPERATURE: 98.3 F

## 2025-07-28 DIAGNOSIS — L03.114 CELLULITIS OF LEFT UPPER EXTREMITY: Primary | ICD-10-CM

## 2025-07-28 PROCEDURE — 99213 OFFICE O/P EST LOW 20 MIN: CPT

## 2025-07-28 PROCEDURE — 1036F TOBACCO NON-USER: CPT

## 2025-07-28 PROCEDURE — G8420 CALC BMI NORM PARAMETERS: HCPCS

## 2025-07-28 PROCEDURE — G8427 DOCREV CUR MEDS BY ELIG CLIN: HCPCS

## 2025-07-28 RX ORDER — MUPIROCIN 2 %
OINTMENT (GRAM) TOPICAL
Qty: 15 G | Refills: 0 | Status: SHIPPED | OUTPATIENT
Start: 2025-07-28

## 2025-07-28 RX ORDER — DOXYCYCLINE 100 MG/1
100 CAPSULE ORAL EVERY 12 HOURS
Qty: 20 CAPSULE | Refills: 0 | Status: SHIPPED | OUTPATIENT
Start: 2025-07-28 | End: 2025-08-07

## 2025-07-28 NOTE — PROGRESS NOTES
Chief Complaint   Skin Problem (Pump site is very red)    History of Present Illness   Source of history provided by:  patient.  History of Present Illness  The patient is a 40-year-old female who presents with an abscess on her arm.    She has been using an insulin pump, which she typically wears on the back of her arm due to excessive sweating. Recently, she noticed that the pump was not delivering insulin as expected and eventually stopped working altogether. Upon removing the pump, she observed a white substance and some blood. She applied a compress to the area and slept on it, but it remains warm. The drainage from the abscess has slowed down, and it is located lower than usual.     A similar issue occurred a year ago, resulting in a large abscess the size of a softball. This required incision and drainage (I & D) and antibiotic treatment. She is unsure if a culture was taken during her previous treatment, but she was prescribed antibiotics at that time. The current abscess is located in the same spot as the previous one. She has been avoiding sunburn on her arms as it causes her skin to peel, which interferes with her monitor.    Occupations: Works in healthcare    PAST SURGICAL HISTORY:  - Incision and drainage (I & D) of a large abscess on the arm approximately one year ago.    All other ROS negative unless otherwise stated in HPI.       ROS    Unless otherwise stated in this report or unable to obtain because of the patient's clinical or mental status as evidenced by the medical record, this patients's positive and negative responses for Review of Systems, constitutional, psych, eyes, ENT, cardiovascular, respiratory, gastrointestinal, neurological, genitourinary, musculoskeletal, integument systems and systems related to the presenting problem are either stated in the preceding or were not pertinent or were negative for the symptoms and/or complaints related to the medical problem.    Physical Exam